# Patient Record
Sex: FEMALE | Race: ASIAN | NOT HISPANIC OR LATINO | Employment: OTHER | URBAN - METROPOLITAN AREA
[De-identification: names, ages, dates, MRNs, and addresses within clinical notes are randomized per-mention and may not be internally consistent; named-entity substitution may affect disease eponyms.]

---

## 2020-01-29 ENCOUNTER — COMMUNICATION - HEALTHEAST (OUTPATIENT)
Dept: TELEHEALTH | Facility: CLINIC | Age: 40
End: 2020-01-29

## 2020-01-29 ENCOUNTER — OFFICE VISIT - HEALTHEAST (OUTPATIENT)
Dept: FAMILY MEDICINE | Facility: CLINIC | Age: 40
End: 2020-01-29

## 2020-01-29 DIAGNOSIS — G43.119 INTRACTABLE MIGRAINE WITH AURA WITHOUT STATUS MIGRAINOSUS: ICD-10-CM

## 2020-01-29 DIAGNOSIS — E61.1 IRON DEFICIENCY: ICD-10-CM

## 2020-01-29 DIAGNOSIS — Z00.00 ROUTINE GENERAL MEDICAL EXAMINATION AT A HEALTH CARE FACILITY: ICD-10-CM

## 2020-01-29 DIAGNOSIS — K21.00 GASTROESOPHAGEAL REFLUX DISEASE WITH ESOPHAGITIS: ICD-10-CM

## 2020-01-29 DIAGNOSIS — Z12.4 SCREENING FOR CERVICAL CANCER: ICD-10-CM

## 2020-01-29 DIAGNOSIS — Z13.228 SCREENING FOR METABOLIC DISORDER: ICD-10-CM

## 2020-01-29 DIAGNOSIS — Z76.89 ENCOUNTER TO ESTABLISH CARE: ICD-10-CM

## 2020-01-29 LAB
CHOLEST SERPL-MCNC: 150 MG/DL
ERYTHROCYTE [DISTWIDTH] IN BLOOD BY AUTOMATED COUNT: 13.5 % (ref 11–14.5)
FASTING STATUS PATIENT QL REPORTED: NO
HBA1C MFR BLD: 5.4 % (ref 3.5–6)
HCT VFR BLD AUTO: 33.8 % (ref 35–47)
HDLC SERPL-MCNC: 59 MG/DL
HGB BLD-MCNC: 11.3 G/DL (ref 12–16)
LDLC SERPL CALC-MCNC: 81 MG/DL
MCH RBC QN AUTO: 31.7 PG (ref 27–34)
MCHC RBC AUTO-ENTMCNC: 33.4 G/DL (ref 32–36)
MCV RBC AUTO: 95 FL (ref 80–100)
PLATELET # BLD AUTO: 571 THOU/UL (ref 140–440)
RBC # BLD AUTO: 3.57 MILL/UL (ref 3.8–5.4)
TRIGL SERPL-MCNC: 50 MG/DL
TSH SERPL DL<=0.005 MIU/L-ACNC: 2.4 UIU/ML (ref 0.3–5)
WBC: 11.5 THOU/UL (ref 4–11)

## 2020-01-29 ASSESSMENT — ANXIETY QUESTIONNAIRES
IF YOU CHECKED OFF ANY PROBLEMS ON THIS QUESTIONNAIRE, HOW DIFFICULT HAVE THESE PROBLEMS MADE IT FOR YOU TO DO YOUR WORK, TAKE CARE OF THINGS AT HOME, OR GET ALONG WITH OTHER PEOPLE: NOT DIFFICULT AT ALL
GAD7 TOTAL SCORE: 1
3. WORRYING TOO MUCH ABOUT DIFFERENT THINGS: SEVERAL DAYS
4. TROUBLE RELAXING: NOT AT ALL
5. BEING SO RESTLESS THAT IT IS HARD TO SIT STILL: NOT AT ALL
6. BECOMING EASILY ANNOYED OR IRRITABLE: NOT AT ALL
2. NOT BEING ABLE TO STOP OR CONTROL WORRYING: NOT AT ALL
1. FEELING NERVOUS, ANXIOUS, OR ON EDGE: NOT AT ALL
7. FEELING AFRAID AS IF SOMETHING AWFUL MIGHT HAPPEN: NOT AT ALL

## 2020-01-29 ASSESSMENT — MIFFLIN-ST. JEOR: SCORE: 1366.24

## 2020-01-29 ASSESSMENT — PATIENT HEALTH QUESTIONNAIRE - PHQ9: SUM OF ALL RESPONSES TO PHQ QUESTIONS 1-9: 0

## 2020-01-31 ENCOUNTER — COMMUNICATION - HEALTHEAST (OUTPATIENT)
Dept: FAMILY MEDICINE | Facility: CLINIC | Age: 40
End: 2020-01-31

## 2020-02-10 ENCOUNTER — COMMUNICATION - HEALTHEAST (OUTPATIENT)
Dept: FAMILY MEDICINE | Facility: CLINIC | Age: 40
End: 2020-02-10

## 2020-02-12 ENCOUNTER — COMMUNICATION - HEALTHEAST (OUTPATIENT)
Dept: FAMILY MEDICINE | Facility: CLINIC | Age: 40
End: 2020-02-12

## 2020-02-21 ENCOUNTER — OFFICE VISIT - HEALTHEAST (OUTPATIENT)
Dept: FAMILY MEDICINE | Facility: CLINIC | Age: 40
End: 2020-02-21

## 2020-02-21 DIAGNOSIS — B97.89 VIRAL SORE THROAT: ICD-10-CM

## 2020-02-21 DIAGNOSIS — G43.719 INTRACTABLE CHRONIC MIGRAINE WITHOUT AURA AND WITHOUT STATUS MIGRAINOSUS: ICD-10-CM

## 2020-02-21 DIAGNOSIS — Z12.4 PAP SMEAR FOR CERVICAL CANCER SCREENING: ICD-10-CM

## 2020-02-21 DIAGNOSIS — J02.8 VIRAL SORE THROAT: ICD-10-CM

## 2020-02-21 DIAGNOSIS — K21.00 GASTROESOPHAGEAL REFLUX DISEASE WITH ESOPHAGITIS: ICD-10-CM

## 2020-02-24 ENCOUNTER — COMMUNICATION - HEALTHEAST (OUTPATIENT)
Dept: FAMILY MEDICINE | Facility: CLINIC | Age: 40
End: 2020-02-24

## 2020-02-24 LAB
HPV SOURCE: NORMAL
HUMAN PAPILLOMA VIRUS 16 DNA: NEGATIVE
HUMAN PAPILLOMA VIRUS 18 DNA: NEGATIVE
HUMAN PAPILLOMA VIRUS FINAL DIAGNOSIS: NORMAL
HUMAN PAPILLOMA VIRUS OTHER HR: NEGATIVE
SPECIMEN DESCRIPTION: NORMAL

## 2020-02-26 ENCOUNTER — COMMUNICATION - HEALTHEAST (OUTPATIENT)
Dept: FAMILY MEDICINE | Facility: CLINIC | Age: 40
End: 2020-02-26

## 2020-03-04 ENCOUNTER — COMMUNICATION - HEALTHEAST (OUTPATIENT)
Dept: FAMILY MEDICINE | Facility: CLINIC | Age: 40
End: 2020-03-04

## 2020-03-25 ENCOUNTER — COMMUNICATION - HEALTHEAST (OUTPATIENT)
Dept: FAMILY MEDICINE | Facility: CLINIC | Age: 40
End: 2020-03-25

## 2020-03-25 DIAGNOSIS — K21.00 GASTROESOPHAGEAL REFLUX DISEASE WITH ESOPHAGITIS: ICD-10-CM

## 2020-04-24 ENCOUNTER — COMMUNICATION - HEALTHEAST (OUTPATIENT)
Dept: FAMILY MEDICINE | Facility: CLINIC | Age: 40
End: 2020-04-24

## 2020-04-24 DIAGNOSIS — K21.00 GASTROESOPHAGEAL REFLUX DISEASE WITH ESOPHAGITIS: ICD-10-CM

## 2020-06-09 ENCOUNTER — COMMUNICATION - HEALTHEAST (OUTPATIENT)
Dept: FAMILY MEDICINE | Facility: CLINIC | Age: 40
End: 2020-06-09

## 2020-06-09 DIAGNOSIS — K21.00 GASTROESOPHAGEAL REFLUX DISEASE WITH ESOPHAGITIS: ICD-10-CM

## 2020-08-08 ENCOUNTER — COMMUNICATION - HEALTHEAST (OUTPATIENT)
Dept: FAMILY MEDICINE | Facility: CLINIC | Age: 40
End: 2020-08-08

## 2020-08-08 DIAGNOSIS — G43.719 INTRACTABLE CHRONIC MIGRAINE WITHOUT AURA AND WITHOUT STATUS MIGRAINOSUS: ICD-10-CM

## 2020-09-21 ENCOUNTER — COMMUNICATION - HEALTHEAST (OUTPATIENT)
Dept: FAMILY MEDICINE | Facility: CLINIC | Age: 40
End: 2020-09-21

## 2020-09-21 DIAGNOSIS — G43.719 INTRACTABLE CHRONIC MIGRAINE WITHOUT AURA AND WITHOUT STATUS MIGRAINOSUS: ICD-10-CM

## 2020-10-07 ENCOUNTER — OFFICE VISIT - HEALTHEAST (OUTPATIENT)
Dept: FAMILY MEDICINE | Facility: CLINIC | Age: 40
End: 2020-10-07

## 2020-10-07 DIAGNOSIS — R35.0 URINE FREQUENCY: ICD-10-CM

## 2020-10-07 DIAGNOSIS — Z79.899 MEDICATION MANAGEMENT: ICD-10-CM

## 2020-10-07 DIAGNOSIS — G43.719 INTRACTABLE CHRONIC MIGRAINE WITHOUT AURA AND WITHOUT STATUS MIGRAINOSUS: ICD-10-CM

## 2020-10-07 DIAGNOSIS — G43.119 INTRACTABLE MIGRAINE WITH AURA WITHOUT STATUS MIGRAINOSUS: ICD-10-CM

## 2020-10-07 LAB
ALBUMIN UR-MCNC: NEGATIVE MG/DL
APPEARANCE UR: CLEAR
BACTERIA #/AREA URNS HPF: ABNORMAL HPF
BILIRUB UR QL STRIP: NEGATIVE
COLOR UR AUTO: YELLOW
GLUCOSE UR STRIP-MCNC: NEGATIVE MG/DL
HGB UR QL STRIP: ABNORMAL
KETONES UR STRIP-MCNC: NEGATIVE MG/DL
LEUKOCYTE ESTERASE UR QL STRIP: NEGATIVE
NITRATE UR QL: NEGATIVE
PH UR STRIP: 6.5 [PH] (ref 5–8)
RBC #/AREA URNS AUTO: ABNORMAL HPF
SP GR UR STRIP: 1.02 (ref 1–1.03)
SQUAMOUS #/AREA URNS AUTO: ABNORMAL LPF
UROBILINOGEN UR STRIP-ACNC: ABNORMAL
WBC #/AREA URNS AUTO: ABNORMAL HPF

## 2020-10-07 RX ORDER — TOPIRAMATE 50 MG/1
TABLET, FILM COATED ORAL
Qty: 180 TABLET | Refills: 2 | Status: SHIPPED | OUTPATIENT
Start: 2020-10-07 | End: 2022-01-06

## 2020-10-09 ENCOUNTER — COMMUNICATION - HEALTHEAST (OUTPATIENT)
Dept: FAMILY MEDICINE | Facility: CLINIC | Age: 40
End: 2020-10-09

## 2020-10-09 LAB — BACTERIA SPEC CULT: NORMAL

## 2021-01-30 ENCOUNTER — COMMUNICATION - HEALTHEAST (OUTPATIENT)
Dept: FAMILY MEDICINE | Facility: CLINIC | Age: 41
End: 2021-01-30

## 2021-04-01 ENCOUNTER — AMBULATORY - HEALTHEAST (OUTPATIENT)
Dept: NURSING | Facility: CLINIC | Age: 41
End: 2021-04-01

## 2021-04-22 ENCOUNTER — AMBULATORY - HEALTHEAST (OUTPATIENT)
Dept: NURSING | Facility: CLINIC | Age: 41
End: 2021-04-22

## 2021-05-26 ASSESSMENT — PATIENT HEALTH QUESTIONNAIRE - PHQ9: SUM OF ALL RESPONSES TO PHQ QUESTIONS 1-9: 0

## 2021-05-28 ASSESSMENT — ANXIETY QUESTIONNAIRES: GAD7 TOTAL SCORE: 1

## 2021-06-04 VITALS
HEART RATE: 112 BPM | BODY MASS INDEX: 29.76 KG/M2 | DIASTOLIC BLOOD PRESSURE: 72 MMHG | WEIGHT: 162.7 LBS | SYSTOLIC BLOOD PRESSURE: 100 MMHG

## 2021-06-04 VITALS
HEIGHT: 62 IN | SYSTOLIC BLOOD PRESSURE: 96 MMHG | DIASTOLIC BLOOD PRESSURE: 68 MMHG | HEART RATE: 80 BPM | BODY MASS INDEX: 30.14 KG/M2 | WEIGHT: 163.8 LBS

## 2021-06-05 VITALS
WEIGHT: 163.3 LBS | SYSTOLIC BLOOD PRESSURE: 92 MMHG | DIASTOLIC BLOOD PRESSURE: 60 MMHG | HEART RATE: 72 BPM | BODY MASS INDEX: 29.87 KG/M2

## 2021-06-05 NOTE — TELEPHONE ENCOUNTER
Patient can pick any Multivitamin, or find out which one is covered and I will send the prescription.    Stephanie Villagran MD 2/10/2020 8:23 AM

## 2021-06-05 NOTE — TELEPHONE ENCOUNTER
Medication Request    Medication name: multivit 40-iron-folate 1-dha 18 mg iron- 1 mg-300 mg cap    Requested Pharmacy: [id number] [address] AT [facility] [address]     Reason for request:   medication is not covered by insurance. Please send alternative medication to patients pharmacy.    When did you use medication last?:  Unknown    Patient offered appointment:  N/A - electronic request     Okay to leave a detailed message: yes

## 2021-06-05 NOTE — TELEPHONE ENCOUNTER
Reached out to patient and relayed the below message to patient's . No additional questions at this time. Marnie Mix

## 2021-06-06 NOTE — PROGRESS NOTES
FEMALE PREVENTATIVE EXAM    Assessment and Plan:       Machelle was seen today  Pap breast exam and discuss treatment for migraine   Machelle was seen today for gynecologic exam and breast exam.    Diagnoses and all orders for this visit:    Pap smear for cervical cancer screening  -     Gynecologic Cytology (PAP Smear)  Viral sore throat  Symptomatic treatment     Intractable migraine with aura without status migrainosus  We did all her labs from last visit which are within normal limits will do trial of  preventive medication will try Topamax at bedtime side effects discussed     Gastroesophageal reflux disease with esophagitis  -Advised to keep a food diary avoid the foods which triggers her acid reflux symptoms start taking Prilosec 20 mg first thing in the morning if no improvement in symptoms she will follow-up in the next couple weeks       omeprazole (PRILOSEC OTC) 20 MG tablet; Take 1 tablet (20 mg total) by mouth daily.        Subjective:   Chief Complaint: Machelle Gonzalez is an 40 y.o. female here for a preventative health visit.  Patient immigrated from [place] 2018   With her  and 2 kids 8-year-old daughter and 3-year-old son. Unemployed, patient like to discuss  Follow up on chronic headaches,and her gastroesophageal reflux disease symptoms , also complains of sore throat body ache no fever chills , her daughter had flu , eating drinking fine        HPI: GERD:  Much better with PPI , trying to avoid food which trigger the symptoms     chronic headache : Patient describes her headache more like migraine headache mostly on the left side comes as a throbbing to spread behind her eyes sinuses fatigue in the neck usually she takes diclofenac tablet 50 mg almost taking once weekly sometimes twice per patient not sure if she has any relation with her cycles.  But recently she had a complete eye exam done on 18 January and was told that she does need glasses she does use a lot of her time on the phone or  TV, which could easily be 1 of the trigger she identified.  Rates her headache pain 5/10 most of the time she can just sleep in the dark room with ice pack and take care of the pain just comes with some nausea but no vomiting and also sometimes headache start with acid in her stomach.    Patient's last menstrual period was 02/05/2020 (exact date).     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 No  Do you typically exercising at least 40 min, 3-4 times per week?  Yes  Do you usually eat at least 4 servings of fruit and vegetables a day, include whole grains and fiber and avoid regularly eating high fat foods? Yes  Have you had an eye exam in the past two years? Yes  Do you see a dentist twice per year? Yes  Do you have any concerns regarding sleep? No    Safety Screen  If you own firearms, are they secured in a locked gun cabinet or with trigger locks? Yes    Do you feel you are safe where you are living?: Yes (2/21/2020 12:56 PM)  Do you feel you are safe in your relationship(s)?: Yes (2/21/2020 12:56 PM)      Review of Systems:  Please see above.  The rest of the review of systems are negative for all systems.     Pap History:   Yes - updated in Problem List and Health Maintenance accordingly    Cancer Screening       Status Date      PAP SMEAR Postponed 2/2/2021 Originally 1/29/2002. Patient Declined          Patient Care Team:  Stephanie Villagran MD as PCP - General (Family Medicine)        History     Reviewed By Date/Time Sections Reviewed    Vani Gonzalez CMA 2/21/2020 12:55 PM Tobacco            Objective:   Vital Signs:   Visit Vitals  /72 (Patient Site: Left Arm, Patient Position: Sitting, Cuff Size: Adult Large)   Pulse (!) 112   Wt 162 lb 11.2 oz (73.8 kg)   LMP 02/05/2020 (Exact Date)   Breastfeeding No   BMI  29.76 kg/m         PHYSICAL EXAM  Physical Exam:  General Appearance:  Appears comfortable, Alert, cooperative, no distress,   Head: Normocephalic, without obvious abnormality, atraumatic  Eyes: PERRL, conjunctiva/corneas clear, EOM's intact, both eyes             Nose: Nares normal, no drainage   Throat: Lips, mucosa, and tongue normal; teeth and gums normal  Neck: Supple, symmetrical, trachea midline, no adenopathy;                      Lungs: Clear to auscultation bilaterally, respirations unlabored  Heart: Regular rate and rhythm, S1 and S2 normal, no murmur, rubs or gallop  Pelvic exam: normal external genitalia, vulva, vagina, cervix, uterus and adnexa, PAP: Pap smear done today.  Breasts: breasts appear normal, no suspicious masses, no skin or nipple changes or axillary nodes.  Extremities: Extremities normal, atraumatic, no cyanosis or edema  Pulses: DP pulses are 1-2+ bilat.    Skin: no rashes or lesions  Neurologic: normal and equal strength bilat in upper and lower extremities

## 2021-06-06 NOTE — PROGRESS NOTES
Dear Machelle,    Your recent Pap smear result came back within normal limits including negative for presence of any high risk viruses which are responsible for cervical cancer , we will plan to repeat the pap smear in next  5 yr  Please feel free to call if you have any concerns or questions..    Stephanie Villagran MD 3/2/2020 2:48 PM

## 2021-06-07 NOTE — TELEPHONE ENCOUNTER
Last medication refill was 03/25/20 for 30 tabs    Last office visit was 02/21/20      Will route to provider   Beth Benoit CMA

## 2021-06-08 NOTE — TELEPHONE ENCOUNTER
Refill Approved    Rx renewed per Medication Renewal Policy. Medication was last renewed on 3/25/20.    Jacqueline Shaw, [facility] Triage/Med Refill 6/11/2020     Requested Prescriptions   Pending Prescriptions Disp Refills     omeprazole (PRILOSEC) 20 MG capsule 30 capsule 0     Sig: Take 1 capsule (20 mg total) by mouth.       GI Medications Refill Protocol Passed - 6/9/2020 10:45 PM        Passed - PCP or prescribing provider visit in last 12 or next 3 months.     Last office visit with prescriber/PCP: 2/21/2020 Stephanie Villagran MD OR same dept: 2/21/2020 Stephanie Villgaran MD OR same specialty: 2/21/2020 Stephanie Villagran MD  Last physical: 1/29/2020 Last MTM visit: Visit date not found   Next visit within 3 mo: Visit date not found  Next physical within 3 mo: Visit date not found  Prescriber OR PCP: Stephanie Villagran MD  Last diagnosis associated with med order: 1. Gastroesophageal reflux disease with esophagitis  - omeprazole (PRILOSEC) 20 MG capsule; Take 1 capsule (20 mg total) by mouth.  Dispense: 30 capsule; Refill: 0    If protocol passes may refill for 12 months if within 3 months of last provider visit (or a total of 15 months).

## 2021-06-10 NOTE — TELEPHONE ENCOUNTER
RN cannot approve Refill Request    RN can NOT refill this medication PCP messaged that patient is overdue for Labs. Last office visit: 2/21/2020 Stephanie Villagran MD Last Physical: 1/29/2020 Last MTM visit: Visit date not found Last visit same specialty: 2/21/2020 Stephanie Villagran MD.  Next visit within 3 mo: Visit date not found  Next physical within 3 mo: Visit date not found      Jazmin Gallardo, [facility] Triage/Med Refill 8/10/2020    Requested Prescriptions   Pending Prescriptions Disp Refills     topiramate (TOPAMAX) 50 MG tablet [Pharmacy Med Name: TOPIRAMATE 50MG TABLETS] 60 tablet 2     Sig: TAKE 1/2 TABLET BY MOUTH ONCE DAILY FOR 2 WEEKS, THEN INCREASE TO 1 TABLET ONCE DAILY IF NO SIDE EFFECTS       Topiramate Refill Protocol  Failed - 8/8/2020  9:32 PM        Failed - Bicarbonate/Electrolyte panel in last 12 months     No results found for: NA, K, CL, VENOUSBICARB, CO2             Failed - Serum creatinine in last 12 months     No results found for: CREATININE          Passed - PCP or prescribing provider visit in last 12 months or next 3 months     Last office visit with prescriber/PCP: 2/21/2020 Stephanie Villagran MD OR same dept: 2/21/2020 Stephanie Villagran MD OR same specialty: 2/21/2020 Stephanie Villagran MD  Last physical: 1/29/2020 Last MTM visit: Visit date not found   Next visit within 3 mo: Visit date not found  Next physical within 3 mo: Visit date not found  Prescriber OR PCP: Stephanie Villagran MD  Last diagnosis associated with med order: 1. Intractable chronic migraine without aura and without status migrainosus  - topiramate (TOPAMAX) 50 MG tablet [Pharmacy Med Name: TOPIRAMATE 50MG TABLETS]; TAKE 1/2 TABLET BY MOUTH ONCE DAILY FOR 2 WEEKS, THEN INCREASE TO 1 TABLET ONCE DAILY IF NO SIDE EFFECTS  Dispense: 60 tablet; Refill: 2    If protocol passes may refill for 12 months if within 3 months of last provider visit (or a total of 15 months).

## 2021-06-12 NOTE — PROGRESS NOTES
FEMALE PREVENTATIVE EXAM    Assessment and Plan:       Machelle was seen today  Pap breast exam and discuss treatment for migraine   Machelle was seen today for gynecologic exam and breast exam.    Diagnoses and all orders for this visit:  Machelle was seen today for medication management.    Diagnoses and all orders for this visit:    Intractable migraine with aura without status migrainosus  -     aspirin-acetaminophen-caffeine (EXCEDRIN MIGRAINE) 250-250-65 mg per tablet; Take 1 tablet by mouth every 6 (six) hours as needed for pain.    Medication management    Intractable chronic migraine without aura and without status migrainosus  will increase her  Topamax to total 100 mg gradually to optimize the treatment at bedtime side effects discussed   Also recommend taking Excedrin migraine as needed     -     topiramate (TOPAMAX) 50 MG tablet; One tab twice daily    Urine frequency  New symptoms today feel more urgency since last 2 month no pain  No  fever chills sweat, chest pain, shortness of breath , nausea ,diarrhea, or vomiting    -     Urinalysis-UC if Indicated    Other orders  -     Influenza, Seasonal Quad, PF =/> 6months     Gastroesophageal reflux disease with esophagitis  -stable doing well     Follow up 3 month or early as needed     Subjective:   Chief Complaint: Machelle Gonzalez is an 40 y.o. female here for a preventative health visit.  Patient immigrated from [place] 2018   With her  and 2 kids 8-year-old daughter and 3-year-old son.  Follow up on chronic headaches,and her gastroesophageal reflux disease symptoms ,   HPI: GERD:  Much better with PPI , trying to avoid food which trigger the symptoms     chronic headache : started topamax last visit which she feel had cut down the intensity and frequency.  But continue migraine headache mostly on the left side comes as a throbbing to spread behind her eyes sinuses fatigue in the neck usually she takes diclofenac tablet 50 mg almost taking once weekly  sometimes twice per patient not sure if she has any relation with her cycles.  But recently she had a complete eye exam done on 18 January and was told that she does need glasses she does use a lot of her time on the phone or TV, which could easily be 1 of the trigger she identified.  Rates her headache pain 4/10 most of the time she can just sleep in the dark room with ice pack and take care of the pain just comes with some nausea but no vomiting and also sometimes headache start with acid in her stomach.    Patient's last menstrual period was 09/23/2020 (exact date).     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 No  Do you typically exercising at least 40 min, 3-4 times per week?  Yes  Do you usually eat at least 4 servings of fruit and vegetables a day, include whole grains and fiber and avoid regularly eating high fat foods? Yes  Have you had an eye exam in the past two years? Yes  Do you see a dentist twice per year? Yes  Do you have any concerns regarding sleep? No    Safety Screen  If you own firearms, are they secured in a locked gun cabinet or with trigger locks? Yes    Do you feel you are safe where you are living?: Yes (10/7/2020  3:20 PM)  Do you feel you are safe in your relationship(s)?: Yes (10/7/2020  3:20 PM)      Review of Systems:  Please see above.  The rest of the review of systems are negative for all systems.     Pap History:   Yes - updated in Problem List and Health Maintenance accordingly    Cancer Screening       Status Date      PAP SMEAR Next Due 2/21/2025      Done 2/21/2020 GYNECOLOGIC CYTOLOGY (PAP SMEAR)          Patient Care Team:  Stephanie Villagran MD as PCP - General (Family Medicine)  Stephanie Villagran MD as Assigned PCP        History     Reviewed By Date/Time Sections Reviewed    Vani Gonzalez  M, CMA 10/7/2020  3:20 PM Tobacco            Objective:   Vital Signs:   Visit Vitals  BP 92/60 (Patient Site: Left Arm, Patient Position: Sitting, Cuff Size: Adult Large)   Pulse 72   Wt 163 lb 4.8 oz (74.1 kg)   LMP 09/23/2020 (Exact Date)   Breastfeeding No   BMI 29.87 kg/m         PHYSICAL EXAM  Physical Exam:  General Appearance:  Appears comfortable, Alert, cooperative, no distress,   Head: Normocephalic, without obvious abnormality, atraumatic  Eyes: PERRL, conjunctiva/corneas clear, EOM's intact, both eyes             Nose: Nares normal, no drainage   Throat: Lips, mucosa, and tongue normal; teeth and gums normal  Neck: Supple, symmetrical, trachea midline, no adenopathy;                      Lungs: Clear to auscultation bilaterally, respirations unlabored  Heart: Regular rate and rhythm, S1 and S2 normal, no murmur, rubs or gallop  Extremities: Extremities normal, atraumatic, no cyanosis or edema  Pulses: DP pulses are 1-2+ bilat.    Skin: no rashes or lesions  Neurologic: normal and equal strength bilat in upper and lower extremities

## 2021-06-16 PROBLEM — K21.00 GASTROESOPHAGEAL REFLUX DISEASE WITH ESOPHAGITIS: Status: ACTIVE | Noted: 2020-01-29

## 2021-06-16 PROBLEM — G43.119 INTRACTABLE MIGRAINE WITH AURA WITHOUT STATUS MIGRAINOSUS: Status: ACTIVE | Noted: 2020-01-29

## 2021-06-18 NOTE — PATIENT INSTRUCTIONS - HE
Patient Instructions by Stephanie Villagran MD at 1/29/2020  3:40 PM     Author: Stephanie Villagran MD Service: -- Author Type: Physician    Filed: 1/29/2020  4:34 PM Encounter Date: 1/29/2020 Status: Signed    : Stephanie Villagran MD (Physician)         Patient Education     Tips to Control Acid Reflux    To control acid reflux, youll need to make some basic diet and lifestyle changes. The simple steps outlined below may be all youll need to ease discomfort.  Watch what you eat    Don't have fatty foods or spicy foods.    Eat fewer acidic foods, such as citrus and tomato-based foods. These can increase symptoms.    Limit drinking alcohol, caffeine, and fizzy beverages. All increase acid reflux.    Try limiting chocolate, peppermint, and spearmint. These can make acid reflux worse in some people.    Watch when you eat    Don't lie down for 3 hours after eating.    Don't snack before going to bed.    Raise your head  Raising your head and upper body by 4 to 6 inches helps limit reflux when youre lying down. Put blocks under the head of your bed frame or a wedge under your mattress to raise it.  Other changes    Lose weight, if you need to    Dont exercise near bedtime    Don't wear tight-fitting clothes    Limit aspirin and ibuprofen    Stop smoking    Date Last Reviewed: 7/1/2016 2000-2019 [employer]. [address] [place] [address]. All rights reserved. This information is not intended as a substitute for professional medical care. Always follow your healthcare professional's instructions.           Patient Education     Medicines for GERD  Gastroesophageal reflux disease (GERD) can be treated with medicine. This may be done with a medicine you can buy over the counter. Or with a medicine that your healthcare provider has to prescribe. In some cases, both types may be used. Your provider will tell you what is best for your symptoms.  Antacids  Antacids work to weaken the acid in your stomach. They  can give you quick relief. You can buy many of them with no prescription. Antacids can be high in sodium. This may be a problem if you have high blood pressure. Some antacids also have aluminum. This should be avoided if you have long-term (chronic) kidney disease. So check with your provider first. Take antacids only when you need to, as advised by your provider.  Side effects: Constipation, diarrhea. If you take too much medicine, it can cause calcium to build up.   H-2 blockers  These cause the stomach to make less acid. They are often used on demand as symptoms occur. And they are used daily to keep symptoms away. Your provider may prescribe them if antacids dont work for you. You can buy some of them over the counter. These come in a lower dosage.  Side effects: Confusion in older adults.  Proton-pump inhibitors  These also cause the stomach to make less acid. They reduce stomach acid more than H-2 blockers. They may be used for a short time, or longer to treat certain conditions. You can buy some of them over the counter. Or your provider may prescribe them. They help control GERD symptoms.  Side effects: Belly pain, diarrhea, upset stomach. Possible other side effects linked to long-term use and high doses.  Prokinetics  These medicines affect the movement of the digestive tract. They may be advised if your stomach is emptying too slowly. But in most cases they are not advised for treating GERD.   Side effects:Tiredness, depression, anxiety, problems with physical movement, belly cramps, constipation, diarrhea, a jittery feeling.   Medicines to stay away from  Dont take aspirin without your healthcare providers approval. And dont take a nonsteroidal anti-inflammatory drug (NSAID), such as ibuprofen. These reduce the protective lining of your stomach. This can lead to more GERD symptoms. Check with your provider or pharmacist before taking a new medicine.  Date Last Reviewed: 7/1/2016 2000-2019 [employer]  "[employer]. [address] [place] [address], [place] [address]. All rights reserved. This information is not intended as a substitute for professional medical care. Always follow your healthcare professional's instructions.           Patient Education     Migraine Headache: Stages and Treatment    A migraine headache tends to progress in stages. Learning these stages can help you better understand what is happening. Then you can learn ways to reduce pain and relieve other symptoms. Methods for relieving your symptoms include self-care and medicines.  Migraine stages  Migraines tend to progress through 4 stages. Many people don't have all stages, and stages may differ with each headache:    Prodrome. A few hours to a day or so before the headache, you may feel tired, (yawning many times), uneasy, or trinh. You may also feel bloated or crave certain foods.    Aura. Up to an hour before the headache starts, some migraine sufferers experience aura--flashing lights, blind spots, other vision problems, confusion, difficulty speaking, or other neurologic symptoms.    Headache. Moderate to severe pain affects one side of the head and then can spread to both sides, often along with nausea. You may be highly sensitive to light, sound, and odors. Vomiting or diarrhea may also happen. This stage lasts 4 to 72 hours.    Postdrome. After your headache ends, you may feel tired, achy, and \"washed out.\" This may last for a day or so.  Self-care during a migraine  Here is what you can do:    Use a cold compress. Wrap a thin cloth around a cold pack, a cold can of soda, or a bag of frozen vegetables. Apply this to your temple or other pain site.    Drink fluids. If nausea makes it hard to drink, try sucking on ice.    Rest. If possible, lie down. Try not to bend over, as this may increase your pain. Sometimes laying in a dark quiet room can help the migraine from being aggravated.      Try caffeine. Some people find that drinking fluids with " caffeine, such as coffee or tea, helps to lessen migraine pain.  Using medicines  Work with your healthcare provider to find the right medicines for you. Medicines for migraine may relieve pain (analgesics), relieve nausea, or attack the migraine's root causes (migraine-specific medicines).  Rebound headache  Taking analgesics each day, or even several times a week, may lead to more frequent and severe headaches. These are called rebound headaches. If you think you're having rebound headaches, tell your healthcare provider. He or she can help you safely decrease your medicine. Rebound caffeine withdrawal headaches can also happen. Certain medicines are addictive and can cause rebound headaches when discontinued abruptly.  Date Last Reviewed: 5/1/2018 2000-2019 [employer]. [address] [place] [address], [place] [address]. All rights reserved. This information is not intended as a substitute for professional medical care. Always follow your healthcare professional's instructions.

## 2021-06-18 NOTE — PATIENT INSTRUCTIONS - HE
Patient Instructions by Stephanie Villagran MD at 2/21/2020 12:40 PM     Author: Stephanie Villagran MD Service: -- Author Type: Physician    Filed: 2/21/2020  1:24 PM Encounter Date: 2/21/2020 Status: Signed    : Stephanie Villagran MD (Physician)         Patient Education     Preventing Migraine Headaches: Medicines and Lifestyle Changes     Going to bed and getting up at the same time each day, including weekends, may help prevent migraines.   A migraine is a type of severe headache. Having a migraine can be very painful. But there are steps you can take to help prevent migraines.  Medicines to help prevent migraines    Your healthcare provider may prescribe certain medicines to help prevent migraines. These medicines may need to be taken daily. Or they may only need to be taken at times when youre likely to have a migraine.    Common medicines used to help prevent migraines include:  ? Triptans (serotonin receptor agonists)  ? Nonsteroidal anti-inflammatory drugs (such as ibuprofen, available over-the-counter)  ? Beta-blockers  ? Anticonvulsants  ? Tricyclic antidepressants  ? Calcium channel blockers  ? Certain vitamins, minerals, and plant extracts  ? Botulinum toxin injection for certain chronic migraines   ? CGRP (calcitonin gene-related peptide) agnonists are being reviewed by the Food and Drug Administration (FDA)    Lifestyle changes for long-term prevention  Here are some suggestions:    Exercise. Regular exercise can help prevent migraines and improve your health. (If exercise triggers your migraines, talk to your healthcare provider.)    Keep regular habits. Dont skip or delay meals. Drink plenty of water. And go to bed and get up at about the same time each day. This includes weekends.    Try alternative treatments. These are treatments that don't involve the use of medicines or surgery. They may help relieve symptoms and prevent migraines. Some treatment options include biofeedback and acupuncture. Ask  your healthcare provider to tell you more about these treatments if you have questions.    Limit caffeine. You may find that caffeine helps relieve pain during an attack. But too much caffeine can also trigger migraines. So, limit the amount of caffeine you consume.  Date Last Reviewed: 5/1/2018 2000-2019 [employer]. [address] [place] [address], [place] [address]. All rights reserved. This information is not intended as a substitute for professional medical care. Always follow your healthcare professional's instructions.

## 2021-06-27 ENCOUNTER — HEALTH MAINTENANCE LETTER (OUTPATIENT)
Age: 41
End: 2021-06-27

## 2021-06-28 NOTE — PROGRESS NOTES
Progress Notes by Stephanie Villagran MD at 2020  3:40 PM     Author: Stephanie Villagran MD Service: -- Author Type: Physician    Filed: 2020  5:27 PM Encounter Date: 2020 Status: Signed    : Stephanie Villagran MD (Physician)       FEMALE PREVENTATIVE EXAM    Assessment and Plan:       Machelle was seen today for establish care and annual exam.    Routine general medical examination at a health care facility    I discussed the following with the patient:   Adult Healthy Living: Importance of regular exercise  Healthy nutrition  Getting adequate sleep  Stress management  Supplement use  Herbal medications/alternative medical therapies    Screening for cervical cancer  Comments:  not ready today , but education provided patient had all her GYN health and care done back in [place] had 2  sections not sure if she ever had a Pap smear done will be ready to do by next visit    Screening for metabolic disorder  -     Glycosylated Hemoglobin A1c  -     Lipid Cascade    Encounter to establish care    Intractable migraine with aura without status migrainosus  We will do some basic labs today change in treatment plan based on the result, also recommend patient should have her prescription glasses done and wear them at least couple weeks if no improvement in her headache frequency by that might need to be on preventive medication most likely will try Topamax at bedtime or Inderal once daily  -     Thyroid Stimulating Hormone (TSH)  -     HM2(CBC w/o Differential)    Gastroesophageal reflux disease with esophagitis  -Advised to keep a food diary avoid the foods which triggers her acid reflux symptoms start taking Prilosec 20 mg first thing in the morning if no improvement in symptoms she will follow-up in the next couple weeks       omeprazole (PRILOSEC OTC) 20 MG tablet; Take 1 tablet (20 mg total) by mouth daily.        Next follow up:  1 yr for annual physical    Immunization Reviewed and if needed ordered please  see A/P    There are no preventive care reminders to display for this patient.      There is no immunization history on file for this patient.      The following high BMI interventions were performed this visit: dietary management education, guidance, and counseling    I have had an Advance Directives discussion with the patient.    Subjective:   Chief Complaint: Machelle Gonzalez is an 40 y.o. female here for a preventative health visit.  Patient immigrated from [place] 2018   With her  and 2 kids 8-year-old daughter and 3-year-old son. Unemployed, patient like to discuss to concern mostly chronic headaches, consistent with gas and acid in the stomach.     HPI: GERD: Paitent complains of heartburn. This has been associated with abdominal bloating, belching and eructation and midespigastric pain.  She denies chest pain, choking on food, cough, deep pressure at base of neck, difficulty swallowing, dysphagia, early satiety, hematemesis, hoarseness, laryngitis, nocturnal burning and odynophagia. Symptoms have been present for several years. She denies dysphagia.  She has not lost weight. She denies melena, hematochezia, hematemesis, and coffee ground emesis. Medical therapy in the past has included none.    chronic headache : Patient describes her headache more like migraine headache mostly on the left side comes as a throbbing to spread behind her eyes sinuses fatigue in the neck usually she takes diclofenac tablet 50 mg almost taking once weekly sometimes twice per patient not sure if she has any relation with her cycles.  But recently she had a complete eye exam done on 18 January and was told that she does need glasses she does use a lot of her time on the phone or TV, which could easily be 1 of the trigger she identified.  Rates her headache pain 5/10 most of the time she can just sleep in the dark room with ice pack and take care of the pain just comes with some nausea but no vomiting and also sometimes  "headache start with acid in her stomach.    Patient's last menstrual period was 01/13/2020 (exact date).     No data recorded   No data recorded     Social History     Social History Narrative   ? Not on file      Healthy Habits  Are you taking a daily aspirin? No  Do you typically exercising at least 40 min, 3-4 times per week?  Yes  Do you usually eat at least 4 servings of fruit and vegetables a day, include whole grains and fiber and avoid regularly eating high fat foods? Yes  Have you had an eye exam in the past two years? Yes  Do you see a dentist twice per year? Yes  Do you have any concerns regarding sleep? No    Safety Screen  If you own firearms, are they secured in a locked gun cabinet or with trigger locks? Yes    Do you feel you are safe where you are living?: Yes (1/29/2020  4:13 PM)  Do you feel you are safe in your relationship(s)?: Yes (1/29/2020  4:13 PM)      Review of Systems:  Please see above.  The rest of the review of systems are negative for all systems.     Pap History:   Yes - updated in Problem List and Health Maintenance accordingly    Cancer Screening       Status Date      PAP SMEAR Postponed 2/2/2021 Originally 1/29/2002. Patient Declined          Patient Care Team:  Provider, No Primary Care as PCP - General        History     Reviewed By Date/Time Sections Reviewed    Vani Gonzalez CMA 1/29/2020  4:13 PM Tobacco    Vani Gonzalez, SANTIAGO 1/29/2020  4:12 PM Tobacco, Alcohol, Drug Use, Sexual Activity            Objective:   Vital Signs:   Visit Vitals  BP 96/68 (Patient Site: Left Arm, Patient Position: Sitting, Cuff Size: Adult Large)   Pulse 80   Ht 5' 2\" (1.575 m)   Wt 163 lb 12.8 oz (74.3 kg)   LMP 01/13/2020 (Exact Date)   Breastfeeding No   BMI 29.96 kg/m         PHYSICAL EXAM  Physical Exam:  General Appearance:  Appears comfortable, Alert, cooperative, no distress,   Head: Normocephalic, without obvious abnormality, atraumatic  Eyes: PERRL, conjunctiva/corneas clear, " "EOM's intact, both eyes             Nose: Nares normal, no drainage   Throat: Lips, mucosa, and tongue normal; teeth and gums normal  Neck: Supple, symmetrical, trachea midline, no adenopathy;                      Lungs: Clear to auscultation bilaterally, respirations unlabored  Heart: Regular rate and rhythm, S1 and S2 normal, no murmur, rubs or gallop  Abdomen: Soft, non-tender, bowel sounds active all four quadrants,   no masses, no organomegaly  Extremities: Extremities normal, atraumatic, no cyanosis or edema  Pulses: DP pulses are 1-2+ bilat.    Skin: no rashes or lesions  Neurologic: normal and equal strength bilat in upper and lower extremities   lla               Medication List          Accurate as of January 29, 2020  5:24 PM. If you have any questions, ask your nurse or doctor.            START taking these medications    omeprazole 20 MG tablet  Also known as:  PriLOSEC OTC  INSTRUCTIONS:  Take 1 tablet (20 mg total) by mouth daily.  Started by:  Stephanie Villagran MD           CONTINUE taking these medications    NON FORMULARY  INSTRUCTIONS:  Take 50 mg by mouth as needed (with onset of headache). \"Dicloran Disperelet\" (from [place])              Where to Get Your Medications      These medications were sent to [id number] - [place], [place] - [address] AT [facility] [address], [place] [address]    Phone:  [phone]     omeprazole 20 MG tablet         Additional Screenings Completed Today:            "

## 2021-07-03 NOTE — ADDENDUM NOTE
Addendum Note by Stephanie Villagran MD at 10/7/2020  3:00 PM     Author: Stephanie Villagran MD Service: -- Author Type: Physician    Filed: 10/8/2020 11:08 AM Encounter Date: 10/7/2020 Status: Signed    : Stephanie Villagran MD (Physician)    Addended by: STEPHANIE VILLAGRAN on: 10/8/2020 11:08 AM        Modules accepted: Orders

## 2021-07-03 NOTE — ADDENDUM NOTE
Addendum Note by Stephanie Villagran MD at 1/29/2020  3:40 PM     Author: Stephanie Villagran MD Service: -- Author Type: Physician    Filed: 1/30/2020  2:36 PM Encounter Date: 1/29/2020 Status: Signed    : Stephanie Villagran MD (Physician)    Addended by: STEPHANIE VILLAGRAN on: 1/30/2020 02:36 PM        Modules accepted: Orders

## 2021-10-17 ENCOUNTER — HEALTH MAINTENANCE LETTER (OUTPATIENT)
Age: 41
End: 2021-10-17

## 2021-12-31 ENCOUNTER — IMMUNIZATION (OUTPATIENT)
Dept: NURSING | Facility: CLINIC | Age: 41
End: 2021-12-31
Payer: COMMERCIAL

## 2021-12-31 PROCEDURE — 91300 PR COVID VAC PFIZER DIL RECON 30 MCG/0.3 ML IM: CPT

## 2021-12-31 PROCEDURE — 0004A PR COVID VAC PFIZER DIL RECON 30 MCG/0.3 ML IM: CPT

## 2022-01-06 ENCOUNTER — OFFICE VISIT (OUTPATIENT)
Dept: FAMILY MEDICINE | Facility: CLINIC | Age: 42
End: 2022-01-06
Payer: COMMERCIAL

## 2022-01-06 VITALS
HEART RATE: 70 BPM | DIASTOLIC BLOOD PRESSURE: 70 MMHG | BODY MASS INDEX: 30.36 KG/M2 | SYSTOLIC BLOOD PRESSURE: 110 MMHG | HEIGHT: 62 IN | WEIGHT: 165 LBS | OXYGEN SATURATION: 100 %

## 2022-01-06 DIAGNOSIS — Z86.32 H/O GESTATIONAL DIABETES MELLITUS, NOT CURRENTLY PREGNANT: ICD-10-CM

## 2022-01-06 DIAGNOSIS — Z86.2 H/O LUPUS ANTICOAGULANT DISORDER: ICD-10-CM

## 2022-01-06 DIAGNOSIS — R73.03 PREDIABETES: ICD-10-CM

## 2022-01-06 DIAGNOSIS — Z00.01 ENCOUNTER FOR ROUTINE ADULT MEDICAL EXAM WITH ABNORMAL FINDINGS: Primary | ICD-10-CM

## 2022-01-06 DIAGNOSIS — G89.29 CHRONIC INTRACTABLE HEADACHE, UNSPECIFIED HEADACHE TYPE: ICD-10-CM

## 2022-01-06 DIAGNOSIS — R51.9 CHRONIC INTRACTABLE HEADACHE, UNSPECIFIED HEADACHE TYPE: ICD-10-CM

## 2022-01-06 DIAGNOSIS — Z13.228 SCREENING FOR METABOLIC DISORDER: ICD-10-CM

## 2022-01-06 DIAGNOSIS — Z87.42 H/O FEMALE INFERTILITY: ICD-10-CM

## 2022-01-06 LAB
CHOLEST SERPL-MCNC: 147 MG/DL
ERYTHROCYTE [DISTWIDTH] IN BLOOD BY AUTOMATED COUNT: 13.9 % (ref 10–15)
FACTOR V INTERPRETATION: NORMAL
FASTING STATUS PATIENT QL REPORTED: NO
HBA1C MFR BLD: 5.7 % (ref 0–5.6)
HCT VFR BLD AUTO: 29.5 % (ref 35–47)
HDLC SERPL-MCNC: 51 MG/DL
HGB BLD-MCNC: 10.6 G/DL (ref 11.7–15.7)
LAB DIRECTOR COMMENTS: NORMAL
LAB DIRECTOR DISCLAIMER: NORMAL
LAB DIRECTOR INTERPRETATION: NORMAL
LAB DIRECTOR METHODOLOGY: NORMAL
LAB DIRECTOR RESULTS: NORMAL
LDLC SERPL CALC-MCNC: 82 MG/DL
MCH RBC QN AUTO: 33.1 PG (ref 26.5–33)
MCHC RBC AUTO-ENTMCNC: 35.9 G/DL (ref 31.5–36.5)
MCV RBC AUTO: 92 FL (ref 78–100)
PLAT MORPH BLD: NORMAL
PLATELET # BLD AUTO: 460 10E3/UL (ref 150–450)
RBC # BLD AUTO: 3.2 10E6/UL (ref 3.8–5.2)
RBC MORPH BLD: NORMAL
SPECIMEN DESCRIPTION: NORMAL
TRIGL SERPL-MCNC: 69 MG/DL
TSH SERPL DL<=0.005 MIU/L-ACNC: 2.42 UIU/ML (ref 0.3–5)
WBC # BLD AUTO: 10.4 10E3/UL (ref 4–11)

## 2022-01-06 PROCEDURE — G0452 MOLECULAR PATHOLOGY INTERPR: HCPCS | Mod: 59 | Performed by: STUDENT IN AN ORGANIZED HEALTH CARE EDUCATION/TRAINING PROGRAM

## 2022-01-06 PROCEDURE — 99396 PREV VISIT EST AGE 40-64: CPT | Performed by: FAMILY MEDICINE

## 2022-01-06 PROCEDURE — 81241 F5 GENE: CPT | Performed by: FAMILY MEDICINE

## 2022-01-06 PROCEDURE — 85390 FIBRINOLYSINS SCREEN I&R: CPT | Performed by: PATHOLOGY

## 2022-01-06 PROCEDURE — 80061 LIPID PANEL: CPT | Performed by: FAMILY MEDICINE

## 2022-01-06 PROCEDURE — 96127 BRIEF EMOTIONAL/BEHAV ASSMT: CPT | Mod: 59 | Performed by: FAMILY MEDICINE

## 2022-01-06 PROCEDURE — 83036 HEMOGLOBIN GLYCOSYLATED A1C: CPT | Performed by: FAMILY MEDICINE

## 2022-01-06 PROCEDURE — 85027 COMPLETE CBC AUTOMATED: CPT | Performed by: FAMILY MEDICINE

## 2022-01-06 PROCEDURE — 36415 COLL VENOUS BLD VENIPUNCTURE: CPT | Performed by: FAMILY MEDICINE

## 2022-01-06 PROCEDURE — 85613 RUSSELL VIPER VENOM DILUTED: CPT | Performed by: FAMILY MEDICINE

## 2022-01-06 PROCEDURE — 85730 THROMBOPLASTIN TIME PARTIAL: CPT | Performed by: FAMILY MEDICINE

## 2022-01-06 PROCEDURE — 84443 ASSAY THYROID STIM HORMONE: CPT | Performed by: FAMILY MEDICINE

## 2022-01-06 PROCEDURE — 99213 OFFICE O/P EST LOW 20 MIN: CPT | Mod: 25 | Performed by: FAMILY MEDICINE

## 2022-01-06 ASSESSMENT — ANXIETY QUESTIONNAIRES
4. TROUBLE RELAXING: NOT AT ALL
IF YOU CHECKED OFF ANY PROBLEMS ON THIS QUESTIONNAIRE, HOW DIFFICULT HAVE THESE PROBLEMS MADE IT FOR YOU TO DO YOUR WORK, TAKE CARE OF THINGS AT HOME, OR GET ALONG WITH OTHER PEOPLE: NOT DIFFICULT AT ALL
2. NOT BEING ABLE TO STOP OR CONTROL WORRYING: NOT AT ALL
5. BEING SO RESTLESS THAT IT IS HARD TO SIT STILL: NOT AT ALL
7. FEELING AFRAID AS IF SOMETHING AWFUL MIGHT HAPPEN: NOT AT ALL
3. WORRYING TOO MUCH ABOUT DIFFERENT THINGS: NOT AT ALL
6. BECOMING EASILY ANNOYED OR IRRITABLE: NOT AT ALL
GAD7 TOTAL SCORE: 0
1. FEELING NERVOUS, ANXIOUS, OR ON EDGE: NOT AT ALL

## 2022-01-06 ASSESSMENT — PATIENT HEALTH QUESTIONNAIRE - PHQ9: SUM OF ALL RESPONSES TO PHQ QUESTIONS 1-9: 0

## 2022-01-06 ASSESSMENT — MIFFLIN-ST. JEOR: SCORE: 1368.69

## 2022-01-06 NOTE — PROGRESS NOTES
"   SUBJECTIVE:   CC: Machelle Gonzalez is an 41 year old woman who presents for preventive health visit.       Patient has been advised of split billing requirements and indicates understanding: Yes     Healthy Habits:     Getting at least 3 servings of Calcium per day:  Yes    Bi-annual eye exam:  Yes    Dental care twice a year:  NO    Sleep apnea or symptoms of sleep apnea:  None    Diet:  Regular (no restrictions)    Frequency of exercise:  None    Taking medications regularly:  Yes    Medication side effects:  None    PHQ-2 Total Score: 0    Additional concerns today:  No        PROBLEMS TO ADD ON ..  Fertility concern patient report today that with her previous pregnancies which she delivered in [place] ,she had a lot of complication and about pregnancy conceived with the help of medication.  During fact pregnancy sounds like baby had significant growth restriction at that time a lot of labs were done and which showed that she might be questionable had either factor V laiden or lupus anticoagulant which require her to be on Lovenox throughout the pregnancy. Patient like to conceive again and like referral done to OB/ infertility specialist   Her cycles regular 28days last 3-4 days , using condoms for protection , she is not actively trying as scared on blood clots     Chronic migraine headache : patient getting 4-8 days of headaches/month. No relation to her cycles per patient , we did try Topamax but she did not like the side effect  \"Patient believe because of her thick blood she might be having migraine headaches and what if she need blood thinner to prevent them\"  Most of the headaches localized in the left temporal area denies any vision change at her last eye exam done last month ear which was normal.  No nausea vomiting  Excedrin Migraine does help      Today's PHQ-2 Score:   PHQ-2 ( 1999 Pfizer) 1/6/2022   Q1: Little interest or pleasure in doing things 0   Q2: Feeling down, depressed or hopeless 0 "   PHQ-2 Score 0   Q1: Little interest or pleasure in doing things Not at all   Q2: Feeling down, depressed or hopeless Not at all   PHQ-2 Score 0       Abuse: Current or Past (Physical, Sexual or Emotional) - No  Do you feel safe in your environment? Yes    Have you ever done Advance Care Planning? (For example, a Health Directive, POLST, or a discussion with a medical provider or your loved ones about your wishes): No, advance care planning information given to patient to review.  Patient plans to discuss their wishes with loved ones or provider.      Social History     Tobacco Use     Smoking status: Never Smoker     Smokeless tobacco: Never Used   Substance Use Topics     Alcohol use: Not on file     If you drink alcohol do you typically have >3 drinks per day or >7 drinks per week? No    Alcohol Use 1/6/2022   Prescreen: >3 drinks/day or >7 drinks/week? No   Prescreen: >3 drinks/day or >7 drinks/week? -       Reviewed orders with patient.  Reviewed health maintenance and updated orders accordingly - Yes  Lab work is in process  Labs reviewed in EPIC  BP Readings from Last 3 Encounters:   01/06/22 110/70   10/07/20 92/60   02/21/20 100/72    Wt Readings from Last 3 Encounters:   01/06/22 74.8 kg (165 lb)   10/07/20 74.1 kg (163 lb 4.8 oz)   02/21/20 73.8 kg (162 lb 11.2 oz)                    Breast Cancer Screening:    Breast CA Risk Assessment (FHS-7) 1/6/2022   Do you have a family history of breast, colon, or ovarian cancer? No / Unknown           Pertinent mammograms are reviewed under the imaging tab.    History of abnormal Pap smear: NO - age 30-65 PAP every 5 years with negative HPV co-testing recommended  PAP / HPV Latest Ref Rng & Units 2/21/2020   PAP Negative for squamous intraepithelial lesion or malignancy. Negative for squamous intraepithelial lesion or malignancy  Electronically signed by Shelli Parra CT (ASCP) on 3/2/2020 at  2:27 PM     HPV16 NEG Negative   HPV18 NEG Negative   HRHPV NEG  "Negative     Reviewed and updated as needed this visit by clinical staff  Tobacco  Allergies  Meds             Reviewed and updated as needed this visit by Provider               No past medical history on file.   No past surgical history on file.    Review of Systems  CONSTITUTIONAL: NEGATIVE for fever, chills, change in weight  INTEGUMENTARU/SKIN: NEGATIVE for worrisome rashes, moles or lesions  EYES: NEGATIVE for vision changes or irritation  ENT: NEGATIVE for ear, mouth and throat problems  RESP: NEGATIVE for significant cough or SOB  BREAST: NEGATIVE for masses, tenderness or discharge  CV: NEGATIVE for chest pain, palpitations or peripheral edema  GI: NEGATIVE for nausea, abdominal pain, heartburn, or change in bowel habits  : NEGATIVE for unusual urinary or vaginal symptoms. Periods are regular.  MUSCULOSKELETAL: NEGATIVE for significant arthralgias or myalgia  NEURO: NEGATIVE for weakness, dizziness or paresthesias  PSYCHIATRIC: NEGATIVE for changes in mood or affect     OBJECTIVE:   /70 (BP Location: Left arm, Patient Position: Sitting, Cuff Size: Adult Regular)   Pulse 70   Ht 1.57 m (5' 1.81\")   Wt 74.8 kg (165 lb)   LMP 01/03/2022 (Exact Date)   SpO2 100%   Breastfeeding No   BMI 30.36 kg/m    Physical Exam  GENERAL: healthy, alert and no distress  EYES: Eyes grossly normal to inspection, PERRL and conjunctivae and sclerae normal  HENT: ear canals and TM's normal, nose and mouth without ulcers or lesions  NECK: no adenopathy, no asymmetry, masses, or scars and thyroid normal to palpation  RESP: lungs clear to auscultation - no rales, rhonchi or wheezes  CV: regular rate and rhythm, normal S1 S2, no S3 or S4, no murmur, click or rub, no peripheral edema and peripheral pulses strong  ABDOMEN: soft, nontender, no hepatosplenomegaly, no masses and bowel sounds normal  MS: no gross musculoskeletal defects noted, no edema  SKIN: no suspicious lesions or rashes  NEURO: Normal strength and " tone, mentation intact and speech normal  PSYCH: mentation appears normal, affect normal/bright    Diagnostic Test Results:  Labs reviewed in Epic    ASSESSMENT/PLAN:   Machelle was seen today for physical.    Diagnoses and all orders for this visit:    Encounter for routine adult medical exam with abnormal findings  Follow-up yearly for routine physical  Screening for metabolic disorder  -     Hemoglobin A1c; Future  -     Lipid Profile; Future  -     Hemoglobin A1c  -     Lipid Profile    Chronic intractable headache, unspecified headache type  -Referral to the headache specialist for treatment options       Adult Neurology Referral; Future    H/O lupus anticoagulant disorder  -     CBC with platelets; Future  -     Lupus Anticoagulant Panel; Future  -     Factor 5 leiden mutation analysis; Future  -     Mat Fetal Med CTR Referral - Preconception; Future  -     TSH with free T4 reflex; Future  -     CBC with platelets  -     Lupus Anticoagulant Panel  -     Factor 5 leiden mutation analysis  -     TSH with free T4 reflex    H/O female infertility  Referral to maternal-fetal medicine provider to evaluate her risk for future pregnancy  Also initial evaluation for lupus anticoagulant and factor V Leiden  -     Mat Fetal Med CTR Referral - Preconception; Future  -     TSH with free T4 reflex; Future  -     TSH with free T4 reflex    H/O gestational diabetes mellitus, not currently pregnant  Patient diabetic screening came back in the prediabetic range today education provided  Prediabetes  Comments:  new DX today based on HgA1c of 5.7 today , adv on healthy food choices ,limit sugars and carbs, and daily walking 30-40 minutes     Other orders  -     REVIEW OF HEALTH MAINTENANCE PROTOCOL ORDERS  -     INFLUENZA VACCINE IM > 6 MONTHS VALENT IIV4 (AFLURIA/FLUZONE)  -     TD (ADULT, 7+) PRESERVE FREE        Patient has been advised of split billing requirements and indicates understanding: Yes  COUNSELING:  Reviewed  "preventive health counseling, as reflected in patient instructions       Regular exercise       Healthy diet/nutrition       Vision screening       Hearing screening       Contraception       Family planning       Advance Care Planning    Estimated body mass index is 30.36 kg/m  as calculated from the following:    Height as of this encounter: 1.57 m (5' 1.81\").    Weight as of this encounter: 74.8 kg (165 lb).    Weight management plan: Discussed healthy diet and exercise guidelines    She reports that she has never smoked. She has never used smokeless tobacco.      Counseling Resources:  ATP IV Guidelines  Pooled Cohorts Equation Calculator  Breast Cancer Risk Calculator  BRCA-Related Cancer Risk Assessment: FHS-7 Tool  FRAX Risk Assessment  ICSI Preventive Guidelines  Dietary Guidelines for Americans, 2010  USDA's MyPlate  ASA Prophylaxis  Lung CA Screening    Stephanie Villagran MD  [facility]  "

## 2022-01-07 LAB
DRVVT SCREEN RATIO: 0.99
INR PPP: 1.02 (ref 0.85–1.15)
LA PPP-IMP: NEGATIVE
LUPUS INTERPRETATION: NORMAL
PTT RATIO: 1.05
THROMBIN TIME: 15.7 SECONDS (ref 13–19)

## 2022-01-27 ENCOUNTER — VIRTUAL VISIT (OUTPATIENT)
Dept: FAMILY MEDICINE | Facility: CLINIC | Age: 42
End: 2022-01-27
Payer: COMMERCIAL

## 2022-01-27 DIAGNOSIS — N97.9 FEMALE INFERTILITY: Primary | ICD-10-CM

## 2022-01-27 DIAGNOSIS — R73.03 PREDIABETES: ICD-10-CM

## 2022-01-27 DIAGNOSIS — D50.8 IRON DEFICIENCY ANEMIA SECONDARY TO INADEQUATE DIETARY IRON INTAKE: ICD-10-CM

## 2022-01-27 PROCEDURE — 99213 OFFICE O/P EST LOW 20 MIN: CPT | Mod: 95 | Performed by: FAMILY MEDICINE

## 2022-01-27 RX ORDER — METFORMIN HCL 500 MG
500 TABLET, EXTENDED RELEASE 24 HR ORAL
Qty: 90 TABLET | Refills: 4 | Status: SHIPPED | OUTPATIENT
Start: 2022-01-27 | End: 2023-02-10

## 2022-01-27 RX ORDER — FERROUS GLUCONATE 324(38)MG
324 TABLET ORAL
Qty: 90 TABLET | Refills: 3 | Status: SHIPPED | OUTPATIENT
Start: 2022-01-27 | End: 2022-04-06

## 2022-01-27 NOTE — PROGRESS NOTES
"Assessment/plan   Machelle Gonzalez is a 41 year old female  who is being evaluated via a billable telephone visit.      The patient has been notified of following:     \"This telephone visit will be conducted via a call between you and your physician/provider. We have found that certain health care needs can be provided without the need for a physical exam.  This service lets us provide the care you need with a short phone conversation.  If a prescription is necessary we can send it directly to your pharmacy.  If lab work is needed we can place an order for that and you can then stop by our lab to have the test done at a later time.    If during the course of the call the physician/provider feels a telephone visit is not appropriate, you will not be charged for this service.\"     Patient has given verbal consent to a Telephone visit? Yes    chief complaint     Machelle was seen today for test results.    Diagnoses and all orders for this visit:    Female infertility    Prediabetes  Is advised to work with  diet and exercise and also adding a Metformin which might help with ovulation also follow-up A1c next 6-month  -     metFORMIN (GLUCOPHAGE-XR) 500 MG 24 hr tablet; Take 1 tablet (500 mg) by mouth daily (with dinner)    Iron deficiency anemia secondary to inadequate dietary iron intake  -We will start the iron supplement with multivitamins recheck in 6 months       ferrous gluconate (FERGON) 324 (38 Fe) MG tablet; Take 1 tablet (324 mg) by mouth daily (with breakfast)        Total time 15 minutes  Subjective:      HPI: Machelle Gonzalez is a 41 year old female who we talked over the phone over her current concerns .  Patient like to talk about her lab result which is significant for low hemoglobin/iron deficiency anemia and also prediabetes.  Patient is referred to the infertility specialist and will be working with them  Otherwise no new concerns        I have personally  went over  patient's allergies, medications, " past medical history, family history, social history, rooming notes and problem list in detail and updated the patient record as necessary.      No past medical history on file.  No past surgical history on file.  Patient has no known allergies.  Current Outpatient Medications   Medication Sig Dispense Refill     aspirin-acetaminophen-caffeine (EXCEDRIN MIGRAINE) 250-250-65 mg per tablet [ASPIRIN-ACETAMINOPHEN-CAFFEINE (EXCEDRIN MIGRAINE) 250-250-65 MG PER TABLET] Take 1 tablet by mouth every 6 (six) hours as needed for pain. 60 tablet 1     ferrous gluconate (FERGON) 324 (38 Fe) MG tablet Take 1 tablet (324 mg) by mouth daily (with breakfast) 90 tablet 3     metFORMIN (GLUCOPHAGE-XR) 500 MG 24 hr tablet Take 1 tablet (500 mg) by mouth daily (with dinner) 90 tablet 4     Family History   Problem Relation Age of Onset     Hypertension Mother      Hypertension Father      Diabetes Father      Heart Disease Father        Patient Active Problem List   Diagnosis     Intractable migraine with aura without status migrainosus     Gastroesophageal reflux disease with esophagitis     H/O gestational diabetes mellitus, not currently pregnant     H/O female infertility     H/O lupus anticoagulant disorder       Review of Systems   12 point comprehensive review of systems was negative except as noted and HPI     Social History     Social History Narrative      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     This note has been dictated using voice recognition software. Any grammatical or context distortions are unintentional and inherent to the software  Stephanie Villagran MD

## 2022-02-23 ENCOUNTER — MYC MEDICAL ADVICE (OUTPATIENT)
Dept: FAMILY MEDICINE | Facility: CLINIC | Age: 42
End: 2022-02-23
Payer: COMMERCIAL

## 2022-04-06 ENCOUNTER — MYC MEDICAL ADVICE (OUTPATIENT)
Dept: FAMILY MEDICINE | Facility: CLINIC | Age: 42
End: 2022-04-06
Payer: COMMERCIAL

## 2022-04-06 DIAGNOSIS — D50.8 IRON DEFICIENCY ANEMIA SECONDARY TO INADEQUATE DIETARY IRON INTAKE: ICD-10-CM

## 2022-04-06 RX ORDER — FERROUS GLUCONATE 324(38)MG
324 TABLET ORAL
Qty: 90 TABLET | Refills: 3 | Status: SHIPPED | OUTPATIENT
Start: 2022-04-06 | End: 2022-12-08

## 2022-04-06 NOTE — TELEPHONE ENCOUNTER
Reason for Call:  Medication Refill:    Do you use a [facility]?  Name of the pharmacy and phone number for the current request:  [facility], [place], [place]    Name of the medication requested:  ferrous gluconate (FERGON) 324 (38 Fe) MG tablet    Can we leave a detailed message on this number? YES    Phone number patient can be reached at: Cell number on file:    Telephone Information:   Mobile [phone]       Best Time: anytime    Althea Ellis

## 2022-12-07 ENCOUNTER — OFFICE VISIT (OUTPATIENT)
Dept: FAMILY MEDICINE | Facility: CLINIC | Age: 42
End: 2022-12-07
Payer: COMMERCIAL

## 2022-12-07 VITALS
SYSTOLIC BLOOD PRESSURE: 116 MMHG | WEIGHT: 161.6 LBS | DIASTOLIC BLOOD PRESSURE: 70 MMHG | HEART RATE: 79 BPM | OXYGEN SATURATION: 99 % | RESPIRATION RATE: 10 BRPM | BODY MASS INDEX: 29.74 KG/M2

## 2022-12-07 DIAGNOSIS — Z13.220 LIPID SCREENING: ICD-10-CM

## 2022-12-07 DIAGNOSIS — N92.6 IRREGULAR MENSTRUAL CYCLE: Primary | ICD-10-CM

## 2022-12-07 DIAGNOSIS — R73.03 PREDIABETES: ICD-10-CM

## 2022-12-07 DIAGNOSIS — D50.8 OTHER IRON DEFICIENCY ANEMIA: ICD-10-CM

## 2022-12-07 LAB
CHOLEST SERPL-MCNC: 149 MG/DL
HBA1C MFR BLD: 5.5 % (ref 0–5.6)
HDLC SERPL-MCNC: 69 MG/DL
HGB BLD-MCNC: 10.3 G/DL (ref 11.7–15.7)
LDLC SERPL CALC-MCNC: 65 MG/DL
NONHDLC SERPL-MCNC: 80 MG/DL
TRIGL SERPL-MCNC: 77 MG/DL
TSH SERPL DL<=0.005 MIU/L-ACNC: 2.16 UIU/ML (ref 0.3–4.2)

## 2022-12-07 PROCEDURE — 85018 HEMOGLOBIN: CPT | Performed by: FAMILY MEDICINE

## 2022-12-07 PROCEDURE — 36415 COLL VENOUS BLD VENIPUNCTURE: CPT | Performed by: FAMILY MEDICINE

## 2022-12-07 PROCEDURE — 83036 HEMOGLOBIN GLYCOSYLATED A1C: CPT | Performed by: FAMILY MEDICINE

## 2022-12-07 PROCEDURE — 99214 OFFICE O/P EST MOD 30 MIN: CPT | Performed by: FAMILY MEDICINE

## 2022-12-07 PROCEDURE — 84443 ASSAY THYROID STIM HORMONE: CPT | Performed by: FAMILY MEDICINE

## 2022-12-07 PROCEDURE — 80061 LIPID PANEL: CPT | Performed by: FAMILY MEDICINE

## 2022-12-07 RX ORDER — INFLUENZA A VIRUS A/NEBRASKA/14/2019 (H1N1) ANTIGEN (MDCK CELL DERIVED, PROPIOLACTONE INACTIVATED), INFLUENZA A VIRUS A/DELAWARE/39/2019 (H3N2) ANTIGEN (MDCK CELL DERIVED, PROPIOLACTONE INACTIVATED), INFLUENZA B VIRUS B/SINGAPORE/INFTT-16-0610/2016 ANTIGEN (MDCK CELL DERIVED, PROPIOLACTONE INACTIVATED), INFLUENZA B VIRUS B/DARWIN/7/2019 ANTIGEN (MDCK CELL DERIVED, PROPIOLACTONE INACTIVATED) 15; 15; 15; 15 UG/.5ML; UG/.5ML; UG/.5ML; UG/.5ML
INJECTION, SUSPENSION INTRAMUSCULAR
COMMUNITY
Start: 2022-08-30 | End: 2022-12-07

## 2022-12-07 NOTE — PROGRESS NOTES
"  Assessment & Plan     Patient presents with:  Menstrual Problem: Irregular last 5 months.       Machelle was seen today for menstrual problem.    Diagnoses and all orders for this visit:    Irregular menstrual cycle  Comments:  She was given option of 3 months of oral contraceptive pills to regulate her cycle which might help her libido which she was taking some over-the-counter supple  Orders:  -     TSH with free T4 reflex; Future  -     TSH with free T4 reflex    Prediabetes  Continue her metformin if she likes but as her A1c is in much good range she can skip the medication  -     Hemoglobin A1c; Future  -     Hemoglobin A1c  Lab Results   Component Value Date    A1C 5.5 12/07/2022    A1C 5.7 01/06/2022    A1C 5.4 01/29/2020       Other iron deficiency anemia  Advised to restart her iron supplement  -     Hemoglobin; Future  -     Hemoglobin  Hemoglobin   Date Value Ref Range Status   12/07/2022 10.3 (L) 11.7 - 15.7 g/dL Final   ]    Lipid screening  -     Lipid Profile; Future  -     Lipid Profile    Other orders  -     REVIEW OF HEALTH MAINTENANCE PROTOCOL ORDERS       We will follow-up on the labs at this point only supplement needed is the iron supplement if any abnormality in TSH level we might have to start her on Synthroid but otherwise no other treatment option given to the patient besides oral contraceptive pills     BMI:   Estimated body mass index is 29.74 kg/m  as calculated from the following:    Height as of 1/6/22: 1.57 m (5' 1.81\").    Weight as of this encounter: 73.3 kg (161 lb 9.6 oz).   Weight management plan: Discussed healthy diet and exercise guidelines        No follow-ups on file.      Subjective   Machelle Lisa 42 year old who presents for the following health issues     HPI   Answers for HPI/ROS submitted by the patient on 12/7/2022  Your back pain is: chronic  Where is your back pain located? : right shoulder  How would you describe your back pain? : stabbing  Where does your back " pain spread? : nowhere  Since you noticed your back pain, how has it changed? : no longer a problem  Does your back pain interfere with your job?: No  What is the reason for your visit today? : about my health  How many servings of fruits and vegetables do you eat daily?: 2-3  On average, how many sweetened beverages do you drink each day (Examples: soda, juice, sweet tea, etc.  Do NOT count diet or artificially sweetened beverages)?: 1  How many minutes a day do you exercise enough to make your heart beat faster?: 10 to 19  How many days a week do you exercise enough to make your heart beat faster?: 5  How many days per week do you miss taking your medication?: 1      Menstrual Concern  Onset/Duration:  Last 5 month   Description:   Duration of bleeding episodes: 4-5 days  Frequency of periods: (1st day of one to 1st day of next):  Every 15-45 days  Describe bleeding/flow:   Clots: YES heavy bleeding on 11/21 but in sept /oct was light   Number of pads/day: 2        Cramping: mild  Accompanying Signs & Symptoms:  Lightheadedness: No  Temperature intolerance: No  Nosebleeds/Easy bruising: No  Vaginal Discharge: No  Acne: No  Change in body hair: No  History:  Patient's last menstrual period was 11/21/2022 (exact date).  Previous normal periods: YES  Contraceptive use: NO  Sexually active: No  Any bleeding after intercourse: No  Abnormal PAP Smears: No  Precipitating or alleviating factors: None  Therapies tried and outcome: None          Patient Active Problem List   Diagnosis     Intractable migraine with aura without status migrainosus     Gastroesophageal reflux disease with esophagitis     H/O gestational diabetes mellitus, not currently pregnant     H/O female infertility     H/O lupus anticoagulant disorder        Current Outpatient Medications   Medication     aspirin-acetaminophen-caffeine (EXCEDRIN MIGRAINE) 250-250-65 mg per tablet     ferrous gluconate (FERGON) 324 (38 Fe) MG tablet     metFORMIN  (GLUCOPHAGE-XR) 500 MG 24 hr tablet     No current facility-administered medications for this visit.          Social Determinants of Health     Tobacco Use: Low Risk      Smoking Tobacco Use: Never     Smokeless Tobacco Use: Never     Passive Exposure: Not on file   Alcohol Use: Not on file   Financial Resource Strain: Not on file   Food Insecurity: Not on file   Transportation Needs: Not on file   Physical Activity: Not on file   Stress: Not on file   Social Connections: Not on file   Intimate Partner Violence: Not on file   Depression: Not at risk     PHQ-2 Score: 0   Housing Stability: Not on file        Review of Systems   Constitutional, HEENT, cardiovascular, pulmonary, GI, , musculoskeletal, neuro, skin, endocrine and psych systems are negative, except as otherwise noted.      Objective    /70   Pulse 79   Resp 10   Wt 73.3 kg (161 lb 9.6 oz)   LMP 11/21/2022 (Exact Date)   SpO2 99%   BMI 29.74 kg/m     LMP 06/01/2011   There is no height or weight on file to calculate BMI.  Physical Exam   GENERAL: healthy, alert and no distress  NECK: no adenopathy, no asymmetry, masses, or scars and thyroid normal to palpation  RESP: lungs clear to auscultation - no rales, rhonchi or wheezes  CV: regular rate and rhythm, normal S1 S2, no S3 or S4, no murmur, click or rub, no peripheral edema and peripheral pulses strong  ABDOMEN: soft, nontender, no hepatosplenomegaly, no masses and bowel sounds normal  MS: no gross musculoskeletal defects noted, no edema    Stephanie Villagran MD   [facility].  [phone]

## 2022-12-08 DIAGNOSIS — D50.8 IRON DEFICIENCY ANEMIA SECONDARY TO INADEQUATE DIETARY IRON INTAKE: ICD-10-CM

## 2022-12-08 RX ORDER — FERROUS GLUCONATE 324(38)MG
324 TABLET ORAL
Qty: 90 TABLET | Refills: 2 | Status: SHIPPED | OUTPATIENT
Start: 2022-12-08

## 2022-12-11 ENCOUNTER — MYC MEDICAL ADVICE (OUTPATIENT)
Dept: FAMILY MEDICINE | Facility: CLINIC | Age: 42
End: 2022-12-11

## 2022-12-12 NOTE — TELEPHONE ENCOUNTER
Sending to PCP for review.  Please review and advise on patient MyChart message.    Patient has question regarding Metformin.     Abi Taylor RN  [facility]

## 2023-02-09 DIAGNOSIS — R73.03 PREDIABETES: ICD-10-CM

## 2023-02-10 RX ORDER — METFORMIN HCL 500 MG
TABLET, EXTENDED RELEASE 24 HR ORAL
Qty: 90 TABLET | Refills: 0 | Status: SHIPPED | OUTPATIENT
Start: 2023-02-10 | End: 2023-05-11

## 2023-02-10 NOTE — TELEPHONE ENCOUNTER
"Routing refill request to provider for review/approval because:  Labs not current:  Creatinine    Last Written Prescription Date:  1/27/22  Last Fill Quantity: 90,  # refills: 4   Last office visit provider:  12/7/22     Requested Prescriptions   Pending Prescriptions Disp Refills     metFORMIN (GLUCOPHAGE XR) 500 MG 24 hr tablet [Pharmacy Med Name: METFORMIN ER 500MG 24HR TABS] 90 tablet 4     Sig: TAKE 1 TABLET(500 MG) BY MOUTH DAILY WITH DINNER       Biguanide Agents Failed - 2/10/2023 11:32 AM        Failed - Patient's CR is NOT>1.4 OR Patient's EGFR is NOT<45 within past 12 mos.     No lab results found.    No lab results found.          Passed - Patient is age 10 or older        Passed - Patient has documented A1c within the specified period of time.     If HgbA1C is 8 or greater, it needs to be on file within the past 3 months.  If less than 8, must be on file within the past 6 months.     Recent Labs   Lab Test 12/07/22  1200   A1C 5.5             Passed - Patient does NOT have a diagnosis of CHF.        Passed - Medication is active on med list        Passed - Patient is not pregnant        Passed - Patient has not had a positive pregnancy test within the past 12 mos.         Passed - Recent (6 mo) or future (30 days) visit within the authorizing provider's specialty     Patient had office visit in the last 6 months or has a visit in the next 30 days with authorizing provider or within the authorizing provider's specialty.  See \"Patient Info\" tab in inbasket, or \"Choose Columns\" in Meds & Orders section of the refill encounter.                 Deepak Casanova RN 02/10/23 11:32 AM  "

## 2023-02-16 PROBLEM — N92.6 IRREGULAR MENSTRUAL CYCLE: Status: ACTIVE | Noted: 2023-02-16

## 2023-05-09 DIAGNOSIS — R73.03 PREDIABETES: ICD-10-CM

## 2023-05-10 NOTE — TELEPHONE ENCOUNTER
"Routing refill request to provider for review/approval because:  Labs not current:  CR    Last Written Prescription Date:  2/10/23  Last Fill Quantity: 90,  # refills: 0   Last office visit provider:   2/6/23    Requested Prescriptions   Pending Prescriptions Disp Refills     metFORMIN (GLUCOPHAGE XR) 500 MG 24 hr tablet [Pharmacy Med Name: METFORMIN ER 500MG 24HR TABS] 90 tablet 0     Sig: TAKE 1 TABLET(500 MG) BY MOUTH DAILY WITH DINNER       Biguanide Agents Failed - 5/9/2023 10:10 PM        Failed - Patient's CR is NOT>1.4 OR Patient's EGFR is NOT<45 within past 12 mos.     No lab results found.    No lab results found.          Passed - Patient is age 10 or older        Passed - Patient has documented A1c within the specified period of time.     If HgbA1C is 8 or greater, it needs to be on file within the past 3 months.  If less than 8, must be on file within the past 6 months.     Recent Labs   Lab Test 12/07/22  1200   A1C 5.5             Passed - Patient does NOT have a diagnosis of CHF.        Passed - Medication is active on med list        Passed - Patient is not pregnant        Passed - Patient has not had a positive pregnancy test within the past 12 mos.         Passed - Recent (6 mo) or future (30 days) visit within the authorizing provider's specialty     Patient had office visit in the last 6 months or has a visit in the next 30 days with authorizing provider or within the authorizing provider's specialty.  See \"Patient Info\" tab in inbasket, or \"Choose Columns\" in Meds & Orders section of the refill encounter.                 Karen Drake RN 05/10/23 2:52 PM  "

## 2023-05-11 RX ORDER — METFORMIN HCL 500 MG
TABLET, EXTENDED RELEASE 24 HR ORAL
Qty: 90 TABLET | Refills: 0 | Status: SHIPPED | OUTPATIENT
Start: 2023-05-11

## 2023-05-14 ENCOUNTER — HEALTH MAINTENANCE LETTER (OUTPATIENT)
Age: 43
End: 2023-05-14

## 2023-05-18 DIAGNOSIS — R73.03 PREDIABETES: ICD-10-CM

## 2023-05-19 RX ORDER — METFORMIN HCL 500 MG
TABLET, EXTENDED RELEASE 24 HR ORAL
Qty: 90 TABLET | Refills: 0 | OUTPATIENT
Start: 2023-05-19

## 2023-05-19 NOTE — TELEPHONE ENCOUNTER
Request denied because  Refilled on 5/11/23 for 90 day supply. (Pharmacy notes: pt requests advance refills to prevent any missed doses.)        Minerva Duarte RN 05/19/23 8:22 AM

## 2024-03-09 ENCOUNTER — HEALTH MAINTENANCE LETTER (OUTPATIENT)
Age: 44
End: 2024-03-09

## 2024-07-21 ENCOUNTER — HEALTH MAINTENANCE LETTER (OUTPATIENT)
Age: 44
End: 2024-07-21

## 2025-01-15 ENCOUNTER — PATIENT OUTREACH (OUTPATIENT)
Dept: CARE COORDINATION | Facility: CLINIC | Age: 45
End: 2025-01-15
Payer: COMMERCIAL

## 2025-01-16 ENCOUNTER — OFFICE VISIT (OUTPATIENT)
Dept: FAMILY MEDICINE | Facility: CLINIC | Age: 45
End: 2025-01-16
Payer: COMMERCIAL

## 2025-01-16 VITALS
OXYGEN SATURATION: 99 % | TEMPERATURE: 97.9 F | HEART RATE: 74 BPM | HEIGHT: 62 IN | WEIGHT: 172 LBS | BODY MASS INDEX: 31.65 KG/M2 | DIASTOLIC BLOOD PRESSURE: 73 MMHG | RESPIRATION RATE: 16 BRPM | SYSTOLIC BLOOD PRESSURE: 108 MMHG

## 2025-01-16 DIAGNOSIS — Z12.31 VISIT FOR SCREENING MAMMOGRAM: ICD-10-CM

## 2025-01-16 DIAGNOSIS — Z87.42 H/O FEMALE INFERTILITY: ICD-10-CM

## 2025-01-16 DIAGNOSIS — N92.0 MENORRHAGIA WITH REGULAR CYCLE: ICD-10-CM

## 2025-01-16 DIAGNOSIS — Z12.4 CERVICAL CANCER SCREENING: ICD-10-CM

## 2025-01-16 DIAGNOSIS — R73.03 PREDIABETES: ICD-10-CM

## 2025-01-16 DIAGNOSIS — Z13.228 SCREENING FOR METABOLIC DISORDER: ICD-10-CM

## 2025-01-16 DIAGNOSIS — Z00.01 ENCOUNTER FOR ROUTINE ADULT MEDICAL EXAM WITH ABNORMAL FINDINGS: Primary | ICD-10-CM

## 2025-01-16 LAB
EST. AVERAGE GLUCOSE BLD GHB EST-MCNC: 117 MG/DL
HBA1C MFR BLD: 5.7 % (ref 0–5.6)

## 2025-01-16 RX ORDER — CHOLECALCIFEROL (VITAMIN D3) 50 MCG
1 TABLET ORAL DAILY
Qty: 90 TABLET | Refills: 1 | Status: SHIPPED | OUTPATIENT
Start: 2025-01-16

## 2025-01-16 RX ORDER — TRANEXAMIC ACID 650 MG/1
1300 TABLET ORAL 3 TIMES DAILY PRN
Qty: 60 TABLET | Refills: 1 | Status: SHIPPED | OUTPATIENT
Start: 2025-01-16

## 2025-01-16 RX ORDER — METFORMIN HYDROCHLORIDE 500 MG/1
500 TABLET, EXTENDED RELEASE ORAL
Qty: 90 TABLET | Refills: 0 | Status: SHIPPED | OUTPATIENT
Start: 2025-01-16

## 2025-01-16 SDOH — HEALTH STABILITY: PHYSICAL HEALTH: ON AVERAGE, HOW MANY DAYS PER WEEK DO YOU ENGAGE IN MODERATE TO STRENUOUS EXERCISE (LIKE A BRISK WALK)?: 6 DAYS

## 2025-01-16 ASSESSMENT — SOCIAL DETERMINANTS OF HEALTH (SDOH): HOW OFTEN DO YOU GET TOGETHER WITH FRIENDS OR RELATIVES?: ONCE A WEEK

## 2025-01-16 ASSESSMENT — PAIN SCALES - GENERAL: PAINLEVEL_OUTOF10: NO PAIN (0)

## 2025-01-16 NOTE — PATIENT INSTRUCTIONS
Patient Education   Preventive Care Advice   This is general advice given by our system to help you stay healthy. However, your care team may have specific advice just for you. Please talk to your care team about your preventive care needs.  Nutrition  Eat 5 or more servings of fruits and vegetables each day.  Try wheat bread, brown rice and whole grain pasta (instead of white bread, rice, and pasta).  Get enough calcium and vitamin D. Check the label on foods and aim for 100% of the RDA (recommended daily allowance).  Lifestyle  Exercise at least 150 minutes each week  (30 minutes a day, 5 days a week).  Do muscle strengthening activities 2 days a week. These help control your weight and prevent disease.  No smoking.  Wear sunscreen to prevent skin cancer.  Have a dental exam and cleaning every 6 months.  Yearly exams  See your health care team every year to talk about:  Any changes in your health.  Any medicines your care team has prescribed.  Preventive care, family planning, and ways to prevent chronic diseases.  Shots (vaccines)   HPV shots (up to age 26), if you've never had them before.  Hepatitis B shots (up to age 59), if you've never had them before.  COVID-19 shot: Get this shot when it's due.  Flu shot: Get a flu shot every year.  Tetanus shot: Get a tetanus shot every 10 years.  Pneumococcal, hepatitis A, and RSV shots: Ask your care team if you need these based on your risk.  Shingles shot (for age 50 and up)  General health tests  Diabetes screening:  Starting at age 35, Get screened for diabetes at least every 3 years.  If you are younger than age 35, ask your care team if you should be screened for diabetes.  Cholesterol test: At age 39, start having a cholesterol test every 5 years, or more often if advised.  Bone density scan (DEXA): At age 50, ask your care team if you should have this scan for osteoporosis (brittle bones).  Hepatitis C: Get tested at least once in your life.  STIs (sexually  transmitted infections)  Before age 24: Ask your care team if you should be screened for STIs.  After age 24: Get screened for STIs if you're at risk. You are at risk for STIs (including HIV) if:  You are sexually active with more than one person.  You don't use condoms every time.  You or a partner was diagnosed with a sexually transmitted infection.  If you are at risk for HIV, ask about PrEP medicine to prevent HIV.  Get tested for HIV at least once in your life, whether you are at risk for HIV or not.  Cancer screening tests  Cervical cancer screening: If you have a cervix, begin getting regular cervical cancer screening tests starting at age 21.  Breast cancer scan (mammogram): If you've ever had breasts, begin having regular mammograms starting at age 40. This is a scan to check for breast cancer.  Colon cancer screening: It is important to start screening for colon cancer at age 45.  Have a colonoscopy test every 10 years (or more often if you're at risk) Or, ask your provider about stool tests like a FIT test every year or Cologuard test every 3 years.  To learn more about your testing options, visit:   .  For help making a decision, visit:   https://bit.ly/vj32384.  Prostate cancer screening test: If you have a prostate, ask your care team if a prostate cancer screening test (PSA) at age 55 is right for you.  Lung cancer screening: If you are a current or former smoker ages 50 to 80, ask your care team if ongoing lung cancer screenings are right for you.  For informational purposes only. Not to replace the advice of your health care provider. Copyright   2023 [employer]. All rights reserved. Clinically reviewed by the [facility] Transitions Program. [employer] [id number] - REV 01/24.

## 2025-01-16 NOTE — PROGRESS NOTES
Preventive Care Visit  [facility] MICHELLE Villagran MD, Family Medicine  Jan 16, 2025      Assessment & Plan     Machelle was seen today for physical.    Diagnoses and all orders for this visit:    Encounter for routine adult medical exam with abnormal findings  -     vitamin D3 (CHOLECALCIFEROL) 50 mcg (2000 units) tablet; Take 1 tablet (50 mcg) by mouth daily.    Menorrhagia with regular cycle  Comments:  had pelvic US done- normal, option of using OCP to regulate cyc or IUD, TXA tabs to be used as needed for heavy bleeding  Orders:  -     tranexamic acid (LYSTEDA) 650 MG tablet; Take 2 tablets (1,300 mg) by mouth 3 times daily as needed (heavy bleeding).    H/O female infertility  Comments:  was seen at [facility] had labs and provera treatment done , all labs normal, most likely perimenopause, if wants to concieve will recom infer specialist    Visit for screening mammogram  -     MA Screening Bilateral w/ Harshal; Future    Cervical cancer screening  Comments:  will follow up on result  Orders:  -     HPV and Gynecologic Cytology Panel - Recommended Age 30 - 65 Years    Screening for metabolic disorder  -     Hemoglobin A1c; Future  -     Lipid panel reflex to direct LDL Fasting; Future  -     Hemoglobin A1c  -     Lipid panel reflex to direct LDL Fasting    Prediabetes  Comments:  restart metformin, as it will help her ? PCOS irregular cycles her whole life .  Orders:  -     metFORMIN (GLUCOPHAGE XR) 500 MG 24 hr tablet; Take 1 tablet (500 mg) by mouth daily (with dinner).  Lab Results   Component Value Date    A1C 5.7 01/16/2025    A1C 5.5 12/07/2022    A1C 5.7 01/06/2022    A1C 5.4 01/29/2020      Other orders  -     PRIMARY CARE FOLLOW-UP SCHEDULING; Future  -     REVIEW OF HEALTH MAINTENANCE PROTOCOL ORDERS  -     COVID-19 12+ (PFIZER); Future  -     TDAP 10-64Y (ADACEL,BOOSTRIX); Future         Patient has been advised of split billing requirements and indicates understanding:  "Yes        BMI  Estimated body mass index is 31.46 kg/m  as calculated from the following:    Height as of this encounter: 1.575 m (5' 2\").    Weight as of this encounter: 78 kg (172 lb).   Weight management plan: Discussed healthy diet and exercise guidelines    Counseling  Appropriate preventive services were addressed with this patient via screening, questionnaire, or discussion as appropriate for fall prevention, nutrition, physical activity, Tobacco-use cessation, social engagement, weight loss and cognition.  Checklist reviewing preventive services available has been given to the patient.  Reviewed patient's diet, addressing concerns and/or questions.   The patient was instructed to see the dentist every 6 months.       MEDICATIONS:   Orders Placed This Encounter   Medications    tranexamic acid (LYSTEDA) 650 MG tablet     Sig: Take 2 tablets (1,300 mg) by mouth 3 times daily as needed (heavy bleeding).     Dispense:  60 tablet     Refill:  1    vitamin D3 (CHOLECALCIFEROL) 50 mcg (2000 units) tablet     Sig: Take 1 tablet (50 mcg) by mouth daily.     Dispense:  90 tablet     Refill:  1    metFORMIN (GLUCOPHAGE XR) 500 MG 24 hr tablet     Sig: Take 1 tablet (500 mg) by mouth daily (with dinner).     Dispense:  90 tablet     Refill:  0          - Continue other medications without change    Gay Carty is a 44 year old, presenting for the following:  Physical (Patient is here for a physical. Not fasting. )        1/16/2025     4:18 PM   Additional Questions   Roomed by scooby gomez cma   Accompanied by self          HPI  Wt Readings from Last 4 Encounters:   01/16/25 78 kg (172 lb)   12/07/22 73.3 kg (161 lb 9.6 oz)   01/06/22 74.8 kg (165 lb)   10/07/20 74.1 kg (163 lb 4.8 oz)      Today we addressed her issue of heavy prolonged menstrual cycle.  She cycles comes every month but the last almost for 12 days  Prior to that she had very irregular cycle but then she did 3-month treatment for Provera " challenge and since then cycles being regular and last 1 year  She doing a DIM supplement which is weak plant-based estrogen which seems to be helping heaviness of her cycle per patient  All started taking D3 plus K2 supplement along with calcium gluconate    Patient did had assisted 2 pregnancies ages 14 and 8-year-old both delivered by     OB History   No obstetric history on file.      Health Care Directive  Patient does not have a Health Care Directive: Patient states has Advance Directive and will bring in a copy to clinic.      2025   General Health   How would you rate your overall physical health? Good   Feel stress (tense, anxious, or unable to sleep) Not at all         2025   Nutrition   Three or more servings of calcium each day? Yes   Diet: Regular (no restrictions)   How many servings of fruit and vegetables per day? (!) 2-3   How many sweetened beverages each day? 0-1         2025   Exercise   Days per week of moderate/strenous exercise 6 days         2025   Social Factors   Frequency of gathering with friends or relatives Once a week   Worry food won't last until get money to buy more No   Food not last or not have enough money for food? No   Do you have housing? (Housing is defined as stable permanent housing and does not include staying ouside in a car, in a tent, in an abandoned building, in an overnight shelter, or couch-surfing.) No   Are you worried about losing your housing? No   Lack of transportation? No   Unable to get utilities (heat,electricity)? No   Want help with housing or utility concern? No   (!) HOUSING CONCERN PRESENT      2025   Dental   Dentist two times every year? (!) NO         2025   TB Screening   Were you born outside of the [place]? Yes         Today's PHQ-2 Score:       2025     4:08 PM   PHQ-2 (  [employer])   Q1: Little interest or pleasure in doing things 0   Q2: Feeling down, depressed or hopeless 0   PHQ-2 Score 0    Q1: Little  interest or pleasure in doing things Not at all   Q2: Feeling down, depressed or hopeless Not at all   PHQ-2 Score 0       Patient-reported           1/16/2025   Substance Use   Alcohol more than 3/day or more than 7/wk No   Do you use any other substances recreationally? No     Social History     Tobacco Use    Smoking status: Never    Smokeless tobacco: Never   Vaping Use    Vaping status: Never Used          Mammogram Screening - Mammogram every 1-2 years updated in Health Maintenance based on mutual decision making        1/16/2025   STI Screening   New sexual partner(s) since last STI/HIV test? No     History of abnormal Pap smear: No - age 30- 64 PAP with HPV every 5 years recommended        Latest Ref Rng & Units 2/21/2020     2:11 PM   PAP / HPV   PAP Negative for squamous intraepithelial lesion or malignancy. Negative for squamous intraepithelial lesion or malignancy  Electronically signed by Shelli Parra CT (ASCP) on 3/2/2020 at  2:27 PM      HPV 16 DNA NEG Negative    HPV 18 DNA NEG Negative    Other HR HPV NEG Negative      ASCVD Risk   The 10-year ASCVD risk score (Maral CHUN, et al., 2019) is: 0.2%    Values used to calculate the score:      Age: 44 years      Sex: Female      Is Non- : No      Diabetic: No      Tobacco smoker: No      Systolic Blood Pressure: 108 mmHg      Is BP treated: No      HDL Cholesterol: 69 mg/dL      Total Cholesterol: 149 mg/dL        1/16/2025   Contraception/Family Planning   Questions about contraception or family planning No        Reviewed and updated as needed this visit by Provider   Tobacco  Allergies  Meds  Problems  Med Hx  Surg Hx  Fam Hx            OB History   No obstetric history on file.         Review of Systems  Constitutional, neuro, ENT, endocrine, pulmonary, cardiac, gastrointestinal, genitourinary, musculoskeletal, integument and psychiatric systems are negative, except as otherwise noted.     Objective   "  Exam  /73 (BP Location: Left arm, Patient Position: Sitting, Cuff Size: Adult Large)   Pulse 74   Temp 97.9  F (36.6  C) (Temporal)   Resp 16   Ht 1.575 m (5' 2\")   Wt 78 kg (172 lb)   LMP 12/18/2024   SpO2 99%   BMI 31.46 kg/m     Estimated body mass index is 31.46 kg/m  as calculated from the following:    Height as of this encounter: 1.575 m (5' 2\").    Weight as of this encounter: 78 kg (172 lb).    Physical Exam  GENERAL: alert and no distress  EYES: Eyes grossly normal to inspection, PERRL and conjunctivae and sclerae normal  HENT: ear canals and TM's normal, nose and mouth without ulcers or lesions  NECK: no adenopathy, no asymmetry, masses, or scars  RESP: lungs clear to auscultation - no rales, rhonchi or wheezes  CV: regular rate and rhythm, normal S1 S2, no S3 or S4, no murmur, click or rub, no peripheral edema  ABDOMEN: soft, nontender, no hepatosplenomegaly, no masses and bowel sounds normal   (female) w/bimanual: normal female external genitalia, normal urethral meatus, normal vaginal mucosa, normal cervix/adnexa/uterus without masses or discharge  MS: no gross musculoskeletal defects noted, no edema        Signed Electronically by: Stephanie Villagran MD    "

## 2025-01-20 LAB
HPV HR 12 DNA CVX QL NAA+PROBE: NEGATIVE
HPV16 DNA CVX QL NAA+PROBE: NEGATIVE
HPV18 DNA CVX QL NAA+PROBE: NEGATIVE
HUMAN PAPILLOMA VIRUS FINAL DIAGNOSIS: NORMAL

## 2025-01-22 LAB
BKR AP ASSOCIATED HPV REPORT: NORMAL
BKR LAB AP GYN ADEQUACY: NORMAL
BKR LAB AP GYN INTERPRETATION: NORMAL
BKR LAB AP PREVIOUS ABNORMAL: NORMAL
PATH REPORT.COMMENTS IMP SPEC: NORMAL
PATH REPORT.COMMENTS IMP SPEC: NORMAL
PATH REPORT.RELEVANT HX SPEC: NORMAL

## 2025-04-08 ENCOUNTER — OFFICE VISIT (OUTPATIENT)
Dept: FAMILY MEDICINE | Facility: CLINIC | Age: 45
End: 2025-04-08
Payer: COMMERCIAL

## 2025-04-08 VITALS
HEIGHT: 62 IN | HEART RATE: 72 BPM | RESPIRATION RATE: 12 BRPM | TEMPERATURE: 97.7 F | BODY MASS INDEX: 31.36 KG/M2 | DIASTOLIC BLOOD PRESSURE: 72 MMHG | WEIGHT: 170.4 LBS | OXYGEN SATURATION: 100 % | SYSTOLIC BLOOD PRESSURE: 100 MMHG

## 2025-04-08 DIAGNOSIS — N39.46 MIXED INCONTINENCE URGE AND STRESS (MALE)(FEMALE): Primary | ICD-10-CM

## 2025-04-08 DIAGNOSIS — R35.0 URINARY FREQUENCY: ICD-10-CM

## 2025-04-08 DIAGNOSIS — K59.01 SLOW TRANSIT CONSTIPATION: ICD-10-CM

## 2025-04-08 LAB
ALBUMIN UR-MCNC: NEGATIVE MG/DL
APPEARANCE UR: CLEAR
BILIRUB UR QL STRIP: NEGATIVE
COLOR UR AUTO: YELLOW
GLUCOSE UR STRIP-MCNC: NEGATIVE MG/DL
HGB UR QL STRIP: NEGATIVE
KETONES UR STRIP-MCNC: NEGATIVE MG/DL
LEUKOCYTE ESTERASE UR QL STRIP: NEGATIVE
NITRATE UR QL: NEGATIVE
PH UR STRIP: 7.5 [PH] (ref 5–8)
SP GR UR STRIP: 1.01 (ref 1–1.03)
UROBILINOGEN UR STRIP-ACNC: 0.2 E.U./DL

## 2025-04-08 PROCEDURE — 81003 URINALYSIS AUTO W/O SCOPE: CPT | Performed by: FAMILY MEDICINE

## 2025-04-08 PROCEDURE — G2211 COMPLEX E/M VISIT ADD ON: HCPCS | Performed by: FAMILY MEDICINE

## 2025-04-08 PROCEDURE — 99214 OFFICE O/P EST MOD 30 MIN: CPT | Performed by: FAMILY MEDICINE

## 2025-04-08 PROCEDURE — 3074F SYST BP LT 130 MM HG: CPT | Performed by: FAMILY MEDICINE

## 2025-04-08 PROCEDURE — 3078F DIAST BP <80 MM HG: CPT | Performed by: FAMILY MEDICINE

## 2025-04-08 RX ORDER — POLYETHYLENE GLYCOL 3350 17 G/17G
1 POWDER, FOR SOLUTION ORAL DAILY
Qty: 850 G | Refills: 1 | Status: SHIPPED | OUTPATIENT
Start: 2025-04-08

## 2025-04-08 RX ORDER — OXYBUTYNIN CHLORIDE 10 MG/1
10 TABLET, EXTENDED RELEASE ORAL DAILY
Qty: 30 TABLET | Refills: 1 | Status: SHIPPED | OUTPATIENT
Start: 2025-04-08

## 2025-04-08 NOTE — PROGRESS NOTES
Machelle was seen today for urinary problem.    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    Urinary frequency  Comments:  Overactive bladder, started Ditropan 1 tablet daily side effect discussed being dry mouth and constipation  Orders:  -     UA Macroscopic with reflex to Microscopic and Culture - Clinic Collect    Slow transit constipation  Comments:  Added MiraLAX to be used every night changed to every other night if any concern for diarrhea  Orders:  -     polyethylene glycol (MIRALAX) 17 GM/Dose powder; Take 17 g (1 Capful) by mouth daily.     The longitudinal plan of care for the diagnosis(es)/condition(s) as documented were addressed during this visit. Due to the added complexity in care, I will continue to support Machelle in the subsequent management and with ongoing continuity of care.  Subjective   Machelle is a 45 year old, presenting for the following health issues:  Urinary Problem (Frequent urination, has to go immediately. Incontinence/leakage. )        2025    11:24 AM   Additional Questions   Roomed by Rebecca CASTRO MA     History of Present Illness       Reason for visit:  F/u urine issues       Genitourinary - Female  Onset/Duration: 3-4 monts ago, whole life but recently worsening as now feel it leaking as well .  Last 3 and 4 months she also started working full-time in a factory and not get enough bathroom breaks.  Patient started wearing pads now.  Had  so no birth trauma  Description:   Painful urination (Dysuria): No           Frequency: YES  Blood in urine (Hematuria): No  Delay in urine (Hesitency): No  Intensity: severe  Progression of Symptoms:  same  Accompanying  "Signs & Symptoms:  Fever/chills: YES- atnight  Flank pain: YES- sometimes  Nausea and vomiting: No  Vaginal symptoms: none  Abdominal/Pelvic Pain: No  History:   History of frequent UTI s: No  History of kidney stones: No  Sexually Active: YES  Possibility of pregnancy: Yes  Precipitating or alleviating factors: None  Therapies tried and outcome: Increase fluid intake       Review of Systems  Constitutional, neuro, ENT, endocrine, pulmonary, cardiac, gastrointestinal,  musculoskeletal, integument and psychiatric systems are negative, except as otherwise noted.      Objective    /72   Pulse 72   Temp 97.7  F (36.5  C) (Temporal)   Resp 12   Ht 1.575 m (5' 2\")   Wt 77.3 kg (170 lb 6.4 oz)   SpO2 100%   BMI 31.17 kg/m    Body mass index is 31.17 kg/m .  Physical Exam   GENERAL: alert and no distress  EYES: Eyes grossly normal to inspection, PERRL and conjunctivae and sclerae normal  HENT: ear canals and TM's normal, nose and mouth without ulcers or lesions  NECK: no adenopathy, no asymmetry, masses, or scars  RESP: lungs clear to auscultation - no rales, rhonchi or wheezes  CV: regular rate and rhythm, normal S1 S2, no S3 or S4, no murmur, click or rub, no peripheral edema  MS: no gross musculoskeletal defects noted, no edema    Office Visit on 01/16/2025   Component Date Value Ref Range Status    Human Papilloma Virus 16 DNA 01/16/2025 Negative  Negative Final    Human Papilloma Virus 18 DNA 01/16/2025 Negative  Negative Final    Human Papilloma Virus Other 01/16/2025 Negative  Negative Final    FINAL DIAGNOSIS 01/16/2025    Final                    Value:This patient's sample is negative for high risk HPV DNA.          METHODOLOGY: The [employer] system uses automated extraction, simultaneous amplification of HPV (E6/E7 oncogenes) and beta-globin, followed by real time detection of fluorescent labeled HPV and beta globin using specific oligonucleotide probes. The test specifically identifies types HPV 16 DNA " and HPV 18 DNA while concurrently detecting the rest of the high risk types (31, 33, 35, 39, 45, 51, 52, 56, 58, 59, 66 or 68).     COMMENTS: This test is not intended for use as a screening device for woman under age 30 with normal cervical cytology. Results should be correlated with cytologic and histologic findings. Close clinical follow up is recommended.    Please see the separate Gynecologic Cytology (Pap) report from the same collection date.      Estimated Average Glucose 01/16/2025 117 (H)  <117 mg/dL Final    Hemoglobin A1C 01/16/2025 5.7 (H)  0.0 - 5.6 % Final    Normal <5.7%   Prediabetes 5.7-6.4%    Diabetes 6.5% or higher     Note: Adopted from ADA consensus guidelines.    Cholesterol 01/16/2025 155  <200 mg/dL Final    Triglycerides 01/16/2025 63  <150 mg/dL Final    Direct Measure HDL 01/16/2025 60  >=50 mg/dL Final    LDL Cholesterol Calculated 01/16/2025 82  <100 mg/dL Final    Non HDL Cholesterol 01/16/2025 95  <130 mg/dL Final    Patient Fasting > 8hrs? 01/16/2025 Unknown   Final    Interpretation 01/16/2025 Negative for Intraepithelial Lesion or Malignancy (NILM)    Final    Comment 01/16/2025    Final                    Value:  Papanicolaou Test Limitations:  Cervical cytology is a screening test with limited sensitivity, and regular screening is critical for cancer prevention.  Pap tests are primarily effective for the diagnosis/prevention of squamous cell carcinoma, not adenocarcinoma or other cancers.        Specimen Adequacy 01/16/2025 Satisfactory for evaluation, endocervical/transformation zone component present   Final    Clinical Information 01/16/2025    Final                    Value:none      Previous Abnormal? 01/16/2025    Final                    Value:No      Performing Labs 01/16/2025    Final                    Value:The technical component of this testing was completed at [facility].    Stain controls for all stains  resulted within this report have been reviewed and show appropriate reactivity.      Associated HPV Report 01/16/2025    Final                    Value:Please see the associated HPV High Risk Types DNA Cervical report for Specimen [id number] from the same collection date.             Signed Electronically by: Stephanie Villagran MD

## 2025-08-22 DIAGNOSIS — R73.03 PREDIABETES: ICD-10-CM

## 2025-08-25 DIAGNOSIS — Z00.01 ENCOUNTER FOR ROUTINE ADULT MEDICAL EXAM WITH ABNORMAL FINDINGS: ICD-10-CM

## 2025-08-26 RX ORDER — CHOLECALCIFEROL (VITAMIN D3) 50 MCG
1 TABLET ORAL DAILY
Qty: 90 TABLET | Refills: 1 | Status: SHIPPED | OUTPATIENT
Start: 2025-08-26

## 2025-08-27 RX ORDER — METFORMIN HYDROCHLORIDE 500 MG/1
500 TABLET, EXTENDED RELEASE ORAL
Qty: 90 TABLET | Refills: 0 | OUTPATIENT
Start: 2025-08-27